# Patient Record
Sex: FEMALE | Race: WHITE | NOT HISPANIC OR LATINO | Employment: UNEMPLOYED | URBAN - METROPOLITAN AREA
[De-identification: names, ages, dates, MRNs, and addresses within clinical notes are randomized per-mention and may not be internally consistent; named-entity substitution may affect disease eponyms.]

---

## 2023-03-13 LAB
ANION GAP IN SER/PLAS: 10 MMOL/L (ref 10–20)
CALCIUM (MG/DL) IN SER/PLAS: 9.3 MG/DL (ref 8.6–10.3)
CARBON DIOXIDE, TOTAL (MMOL/L) IN SER/PLAS: 26 MMOL/L (ref 21–32)
CHLORIDE (MMOL/L) IN SER/PLAS: 107 MMOL/L (ref 98–107)
COBALAMIN (VITAMIN B12) (PG/ML) IN SER/PLAS: >7500 PG/ML (ref 211–911)
CREATININE (MG/DL) IN SER/PLAS: 0.83 MG/DL (ref 0.5–1.05)
ESTIMATED AVERAGE GLUCOSE FOR HBA1C: 105 MG/DL
GFR FEMALE: >90 ML/MIN/1.73M2
GLUCOSE (MG/DL) IN SER/PLAS: 85 MG/DL (ref 74–99)
HEMOGLOBIN A1C/HEMOGLOBIN TOTAL IN BLOOD: 5.3 %
POTASSIUM (MMOL/L) IN SER/PLAS: 4 MMOL/L (ref 3.5–5.3)
SODIUM (MMOL/L) IN SER/PLAS: 139 MMOL/L (ref 136–145)
UREA NITROGEN (MG/DL) IN SER/PLAS: 14 MG/DL (ref 6–23)

## 2023-03-17 ENCOUNTER — TELEPHONE (OUTPATIENT)
Dept: PRIMARY CARE | Facility: CLINIC | Age: 38
End: 2023-03-17

## 2023-03-17 NOTE — TELEPHONE ENCOUNTER
----- Message from CARLOS Peck sent at 3/13/2023  9:03 PM EDT -----  Please tell patient that her lab result is good but hemoglobin A1c of 5.3%.  However, her vitamin B12 level is very high and she should cut back on her intake of vitamin B12 to once weekly.

## 2023-04-14 ENCOUNTER — HOSPITAL ENCOUNTER (OUTPATIENT)
Dept: DATA CONVERSION | Facility: HOSPITAL | Age: 38
End: 2023-04-14
Attending: STUDENT IN AN ORGANIZED HEALTH CARE EDUCATION/TRAINING PROGRAM | Admitting: STUDENT IN AN ORGANIZED HEALTH CARE EDUCATION/TRAINING PROGRAM
Payer: COMMERCIAL

## 2023-04-14 DIAGNOSIS — M79.7 FIBROMYALGIA: ICD-10-CM

## 2023-04-14 DIAGNOSIS — H54.7 UNSPECIFIED VISUAL LOSS: ICD-10-CM

## 2023-04-14 DIAGNOSIS — F41.9 ANXIETY DISORDER, UNSPECIFIED: ICD-10-CM

## 2023-04-14 DIAGNOSIS — N94.6 DYSMENORRHEA, UNSPECIFIED: ICD-10-CM

## 2023-04-14 DIAGNOSIS — E66.9 OBESITY, UNSPECIFIED: ICD-10-CM

## 2023-04-14 DIAGNOSIS — G43.909 MIGRAINE, UNSPECIFIED, NOT INTRACTABLE, WITHOUT STATUS MIGRAINOSUS: ICD-10-CM

## 2023-04-14 DIAGNOSIS — G40.909 EPILEPSY, UNSPECIFIED, NOT INTRACTABLE, WITHOUT STATUS EPILEPTICUS (MULTI): ICD-10-CM

## 2023-04-14 DIAGNOSIS — J45.909 UNSPECIFIED ASTHMA, UNCOMPLICATED (HHS-HCC): ICD-10-CM

## 2023-04-14 DIAGNOSIS — F32.A DEPRESSION, UNSPECIFIED: ICD-10-CM

## 2023-04-14 DIAGNOSIS — N20.0 CALCULUS OF KIDNEY: ICD-10-CM

## 2023-04-14 DIAGNOSIS — N39.3 STRESS INCONTINENCE (FEMALE) (MALE): ICD-10-CM

## 2023-04-14 DIAGNOSIS — N32.81 OVERACTIVE BLADDER: ICD-10-CM

## 2023-04-14 DIAGNOSIS — Z79.84 LONG TERM (CURRENT) USE OF ORAL HYPOGLYCEMIC DRUGS: ICD-10-CM

## 2023-04-14 DIAGNOSIS — N39.41 URGE INCONTINENCE: ICD-10-CM

## 2023-04-14 DIAGNOSIS — K21.9 GASTRO-ESOPHAGEAL REFLUX DISEASE WITHOUT ESOPHAGITIS: ICD-10-CM

## 2023-04-14 DIAGNOSIS — E11.9 TYPE 2 DIABETES MELLITUS WITHOUT COMPLICATIONS (MULTI): ICD-10-CM

## 2023-04-14 DIAGNOSIS — G56.02 CARPAL TUNNEL SYNDROME, LEFT UPPER LIMB: ICD-10-CM

## 2023-04-14 DIAGNOSIS — Z87.891 PERSONAL HISTORY OF NICOTINE DEPENDENCE: ICD-10-CM

## 2023-04-14 LAB — POCT GLUCOSE: 84 MG/DL (ref 74–99)

## 2023-04-28 ENCOUNTER — TELEPHONE (OUTPATIENT)
Dept: PRIMARY CARE | Facility: CLINIC | Age: 38
End: 2023-04-28

## 2023-04-28 ENCOUNTER — HOSPITAL ENCOUNTER (OUTPATIENT)
Dept: DATA CONVERSION | Facility: HOSPITAL | Age: 38
End: 2023-04-28
Attending: STUDENT IN AN ORGANIZED HEALTH CARE EDUCATION/TRAINING PROGRAM | Admitting: STUDENT IN AN ORGANIZED HEALTH CARE EDUCATION/TRAINING PROGRAM
Payer: COMMERCIAL

## 2023-04-28 DIAGNOSIS — N20.0 CALCULUS OF KIDNEY: ICD-10-CM

## 2023-04-28 DIAGNOSIS — G56.03 CARPAL TUNNEL SYNDROME, BILATERAL UPPER LIMBS: ICD-10-CM

## 2023-04-28 DIAGNOSIS — Z87.891 PERSONAL HISTORY OF NICOTINE DEPENDENCE: ICD-10-CM

## 2023-04-28 DIAGNOSIS — J45.909 UNSPECIFIED ASTHMA, UNCOMPLICATED (HHS-HCC): ICD-10-CM

## 2023-04-28 DIAGNOSIS — E11.65 TYPE 2 DIABETES MELLITUS WITH HYPERGLYCEMIA, WITHOUT LONG-TERM CURRENT USE OF INSULIN (MULTI): ICD-10-CM

## 2023-04-28 DIAGNOSIS — M79.642 PAIN IN LEFT HAND: ICD-10-CM

## 2023-04-28 DIAGNOSIS — F32.A DEPRESSION, UNSPECIFIED: ICD-10-CM

## 2023-04-28 DIAGNOSIS — Z79.84 LONG TERM (CURRENT) USE OF ORAL HYPOGLYCEMIC DRUGS: ICD-10-CM

## 2023-04-28 DIAGNOSIS — M25.562 PAIN IN LEFT KNEE: ICD-10-CM

## 2023-04-28 DIAGNOSIS — F41.9 ANXIETY DISORDER, UNSPECIFIED: ICD-10-CM

## 2023-04-28 DIAGNOSIS — E11.65 TYPE 2 DIABETES MELLITUS WITH HYPERGLYCEMIA (MULTI): ICD-10-CM

## 2023-04-28 DIAGNOSIS — M51.36 OTHER INTERVERTEBRAL DISC DEGENERATION, LUMBAR REGION: ICD-10-CM

## 2023-04-28 DIAGNOSIS — J34.2 DEVIATED NASAL SEPTUM: ICD-10-CM

## 2023-04-28 DIAGNOSIS — G89.29 OTHER CHRONIC PAIN: ICD-10-CM

## 2023-04-28 DIAGNOSIS — M79.605 PAIN IN LEFT LEG: ICD-10-CM

## 2023-04-28 DIAGNOSIS — M47.26 OTHER SPONDYLOSIS WITH RADICULOPATHY, LUMBAR REGION: ICD-10-CM

## 2023-04-28 DIAGNOSIS — M79.7 FIBROMYALGIA: ICD-10-CM

## 2023-04-28 DIAGNOSIS — E11.29 TYPE 2 DIABETES MELLITUS WITH OTHER DIABETIC KIDNEY COMPLICATION (MULTI): ICD-10-CM

## 2023-04-28 DIAGNOSIS — E11.42 TYPE 2 DIABETES MELLITUS WITH DIABETIC POLYNEUROPATHY (MULTI): ICD-10-CM

## 2023-04-28 DIAGNOSIS — E55.9 VITAMIN D DEFICIENCY, UNSPECIFIED: ICD-10-CM

## 2023-04-28 DIAGNOSIS — K58.1 IRRITABLE BOWEL SYNDROME WITH CONSTIPATION: ICD-10-CM

## 2023-04-28 DIAGNOSIS — M25.572 PAIN IN LEFT ANKLE AND JOINTS OF LEFT FOOT: ICD-10-CM

## 2023-04-28 DIAGNOSIS — N13.30 UNSPECIFIED HYDRONEPHROSIS: ICD-10-CM

## 2023-04-28 DIAGNOSIS — E53.8 DEFICIENCY OF OTHER SPECIFIED B GROUP VITAMINS: ICD-10-CM

## 2023-04-28 DIAGNOSIS — E11.9 TYPE 2 DIABETES MELLITUS WITHOUT COMPLICATIONS (MULTI): ICD-10-CM

## 2023-04-28 DIAGNOSIS — K21.9 GASTROESOPHAGEAL REFLUX DISEASE WITHOUT ESOPHAGITIS: Primary | ICD-10-CM

## 2023-04-28 DIAGNOSIS — N39.41 URGE INCONTINENCE: ICD-10-CM

## 2023-04-28 DIAGNOSIS — M79.672 PAIN IN LEFT FOOT: ICD-10-CM

## 2023-04-28 DIAGNOSIS — E66.9 OBESITY, UNSPECIFIED: ICD-10-CM

## 2023-04-28 DIAGNOSIS — K29.70 GASTRITIS, UNSPECIFIED, WITHOUT BLEEDING: ICD-10-CM

## 2023-04-28 DIAGNOSIS — G43.909 MIGRAINE, UNSPECIFIED, NOT INTRACTABLE, WITHOUT STATUS MIGRAINOSUS: ICD-10-CM

## 2023-04-28 DIAGNOSIS — H54.7 UNSPECIFIED VISUAL LOSS: ICD-10-CM

## 2023-04-28 DIAGNOSIS — G56.13 OTHER LESIONS OF MEDIAN NERVE, BILATERAL UPPER LIMBS: ICD-10-CM

## 2023-04-28 DIAGNOSIS — K21.9 GASTRO-ESOPHAGEAL REFLUX DISEASE WITHOUT ESOPHAGITIS: ICD-10-CM

## 2023-04-28 DIAGNOSIS — M65.4 RADIAL STYLOID TENOSYNOVITIS (DE QUERVAIN): ICD-10-CM

## 2023-04-28 DIAGNOSIS — N32.81 OVERACTIVE BLADDER: ICD-10-CM

## 2023-04-28 DIAGNOSIS — G40.909 EPILEPSY, UNSPECIFIED, NOT INTRACTABLE, WITHOUT STATUS EPILEPTICUS (MULTI): ICD-10-CM

## 2023-04-28 LAB
ATRIAL RATE: 69 BPM
P AXIS: 22 DEGREES
POCT GLUCOSE: 81 MG/DL (ref 74–99)
PR INTERVAL: 140 MS
Q ONSET: 251 MS
QRS COUNT: 12 BEATS
QRS DURATION: 99 MS
QT INTERVAL: 424 MS
QTC CALCULATION(BAZETT): 461 MS
QTC FREDERICIA: 448 MS
R AXIS: 1 DEGREES
T AXIS: 12 DEGREES
T OFFSET: 463 MS
VENTRICULAR RATE: 71 BPM

## 2023-04-28 RX ORDER — PANTOPRAZOLE SODIUM 40 MG/1
40 TABLET, DELAYED RELEASE ORAL DAILY
COMMUNITY
Start: 2020-01-16 | End: 2023-05-01 | Stop reason: SDUPTHER

## 2023-04-28 RX ORDER — DAPAGLIFLOZIN 5 MG/1
5 TABLET, FILM COATED ORAL DAILY
COMMUNITY
End: 2023-05-01 | Stop reason: SINTOL

## 2023-04-28 NOTE — TELEPHONE ENCOUNTER
Patient states that she has been having multiple yeast infections from the farxiga 5mg. Patient is requesting something else to be called in if possible.      Refill on pantoprazole 40mg to [facility],

## 2023-05-01 RX ORDER — ORAL SEMAGLUTIDE 7 MG/1
7 TABLET ORAL DAILY
Qty: 90 TABLET | Refills: 2 | Status: SHIPPED | OUTPATIENT
Start: 2023-05-01 | End: 2024-01-29 | Stop reason: SDUPTHER

## 2023-05-01 RX ORDER — PANTOPRAZOLE SODIUM 40 MG/1
40 TABLET, DELAYED RELEASE ORAL DAILY
Qty: 90 TABLET | Refills: 2 | Status: SHIPPED | OUTPATIENT
Start: 2023-05-01 | End: 2023-10-24

## 2023-05-02 ENCOUNTER — TELEPHONE (OUTPATIENT)
Dept: PRIMARY CARE | Facility: CLINIC | Age: 38
End: 2023-05-02
Payer: COMMERCIAL

## 2023-05-02 NOTE — TELEPHONE ENCOUNTER
She would Like us to call in for weight loss if could ozempic her pharmacy is [facility] in [place]

## 2023-05-19 PROBLEM — B37.9 YEAST INFECTION: Status: RESOLVED | Noted: 2023-05-19 | Resolved: 2023-05-19

## 2023-05-19 PROBLEM — N39.46 MIXED INCONTINENCE: Status: RESOLVED | Noted: 2023-05-19 | Resolved: 2023-05-19

## 2023-05-19 PROBLEM — E11.65 TYPE 2 DIABETES MELLITUS WITH HYPERGLYCEMIA, WITHOUT LONG-TERM CURRENT USE OF INSULIN (MULTI): Status: ACTIVE | Noted: 2023-05-19

## 2023-05-19 PROBLEM — M25.562 PAIN IN LEFT KNEE: Status: RESOLVED | Noted: 2023-05-19 | Resolved: 2023-05-19

## 2023-05-19 PROBLEM — G43.909 MIGRAINES: Status: ACTIVE | Noted: 2023-05-19

## 2023-05-19 PROBLEM — R25.1 FUNCTIONAL TREMOR: Status: RESOLVED | Noted: 2023-05-19 | Resolved: 2023-05-19

## 2023-05-19 PROBLEM — R09.81 NASAL CONGESTION: Status: RESOLVED | Noted: 2023-05-19 | Resolved: 2023-05-19

## 2023-05-19 PROBLEM — R10.9 LEFT SIDED ABDOMINAL PAIN: Status: RESOLVED | Noted: 2023-05-19 | Resolved: 2023-05-19

## 2023-05-19 PROBLEM — M24.832: Status: RESOLVED | Noted: 2023-05-19 | Resolved: 2023-05-19

## 2023-05-19 PROBLEM — F32.A DEPRESSION: Status: RESOLVED | Noted: 2023-05-19 | Resolved: 2023-05-19

## 2023-05-19 PROBLEM — F44.7 FUNCTIONAL NEUROLOGICAL SYMPTOM DISORDER WITH MIXED SYMPTOMS: Status: RESOLVED | Noted: 2023-05-19 | Resolved: 2023-05-19

## 2023-05-19 PROBLEM — R25.1 INTERMITTENT TREMOR: Status: ACTIVE | Noted: 2023-05-19

## 2023-05-19 PROBLEM — E53.8 LOW SERUM VITAMIN B12: Status: ACTIVE | Noted: 2023-05-19

## 2023-05-19 PROBLEM — F44.5 PSYCHOGENIC NONEPILEPTIC SEIZURE: Status: RESOLVED | Noted: 2023-05-19 | Resolved: 2023-05-19

## 2023-05-19 PROBLEM — K29.70 GASTRITIS: Status: ACTIVE | Noted: 2023-05-19

## 2023-05-19 PROBLEM — N20.0 CALCIUM KIDNEY STONE: Status: RESOLVED | Noted: 2023-05-19 | Resolved: 2023-05-19

## 2023-05-19 PROBLEM — R29.898 WEAKNESS OF EXTREMITY: Status: RESOLVED | Noted: 2023-05-19 | Resolved: 2023-05-19

## 2023-05-19 PROBLEM — M89.9 OSTEOCHONDRAL LESION: Status: RESOLVED | Noted: 2023-05-19 | Resolved: 2023-05-19

## 2023-05-19 PROBLEM — M25.572 ACUTE LEFT ANKLE PAIN: Status: RESOLVED | Noted: 2023-05-19 | Resolved: 2023-05-19

## 2023-05-19 PROBLEM — F41.9 ANXIETY: Status: ACTIVE | Noted: 2023-05-19

## 2023-05-19 PROBLEM — M65.88: Status: RESOLVED | Noted: 2023-05-19 | Resolved: 2023-05-19

## 2023-05-19 PROBLEM — M79.605 DIFFUSE PAIN IN LEFT LOWER EXTREMITY: Status: RESOLVED | Noted: 2023-05-19 | Resolved: 2023-05-19

## 2023-05-19 PROBLEM — M65.4 DE QUERVAIN'S TENOSYNOVITIS, LEFT: Status: RESOLVED | Noted: 2023-05-19 | Resolved: 2023-05-19

## 2023-05-19 PROBLEM — J45.909 ASTHMA (HHS-HCC): Status: ACTIVE | Noted: 2023-05-19

## 2023-05-19 PROBLEM — N39.498 FREQUENT URINARY INCONTINENCE: Status: RESOLVED | Noted: 2023-05-19 | Resolved: 2023-05-19

## 2023-05-19 PROBLEM — R26.9 GAIT DISORDER: Status: ACTIVE | Noted: 2023-05-19

## 2023-05-19 PROBLEM — M54.16 LUMBAR RADICULOPATHY, CHRONIC: Status: RESOLVED | Noted: 2023-05-19 | Resolved: 2023-05-19

## 2023-05-19 PROBLEM — R74.01 ELEVATED LIVER TRANSAMINASE LEVEL: Status: RESOLVED | Noted: 2023-05-19 | Resolved: 2023-05-19

## 2023-05-19 PROBLEM — M79.672 LEFT FOOT PAIN: Status: RESOLVED | Noted: 2023-05-19 | Resolved: 2023-05-19

## 2023-05-19 PROBLEM — E55.9 VITAMIN D DEFICIENCY: Status: ACTIVE | Noted: 2023-05-19

## 2023-05-19 PROBLEM — M79.7 FIBROMYALGIA: Status: ACTIVE | Noted: 2023-05-19

## 2023-05-19 PROBLEM — M51.369 DEGENERATION OF INTERVERTEBRAL DISC OF LUMBAR REGION: Status: RESOLVED | Noted: 2023-05-19 | Resolved: 2023-05-19

## 2023-05-19 PROBLEM — R56.9 NONEPILEPTIC EPISODE (MULTI): Status: RESOLVED | Noted: 2023-05-19 | Resolved: 2023-05-19

## 2023-05-19 PROBLEM — F43.89 STRESS REACTION, CHRONIC: Status: RESOLVED | Noted: 2023-05-19 | Resolved: 2023-05-19

## 2023-05-19 PROBLEM — R13.10 DYSPHAGIA: Status: RESOLVED | Noted: 2023-05-19 | Resolved: 2023-05-19

## 2023-05-19 PROBLEM — S63.502A WRIST SPRAIN, LEFT, INITIAL ENCOUNTER: Status: RESOLVED | Noted: 2023-05-19 | Resolved: 2023-05-19

## 2023-05-19 PROBLEM — M47.816 LUMBAR SPONDYLOSIS: Status: RESOLVED | Noted: 2023-05-19 | Resolved: 2023-05-19

## 2023-05-19 PROBLEM — R35.0 URINATION FREQUENCY: Status: RESOLVED | Noted: 2023-05-19 | Resolved: 2023-05-19

## 2023-05-19 PROBLEM — M54.6 BACK PAIN, THORACIC: Status: RESOLVED | Noted: 2023-05-19 | Resolved: 2023-05-19

## 2023-05-19 PROBLEM — F44.9 CONVERSION DISORDER: Status: RESOLVED | Noted: 2023-05-19 | Resolved: 2023-05-19

## 2023-05-19 PROBLEM — R29.898: Status: RESOLVED | Noted: 2023-05-19 | Resolved: 2023-05-19

## 2023-05-19 PROBLEM — M51.36 DEGENERATION OF INTERVERTEBRAL DISC OF LUMBAR REGION: Status: RESOLVED | Noted: 2023-05-19 | Resolved: 2023-05-19

## 2023-05-19 PROBLEM — K58.1 IRRITABLE BOWEL SYNDROME WITH CONSTIPATION: Status: RESOLVED | Noted: 2023-05-19 | Resolved: 2023-05-19

## 2023-05-19 PROBLEM — M54.50 LOWER BACK PAIN: Status: RESOLVED | Noted: 2023-05-19 | Resolved: 2023-05-19

## 2023-05-19 PROBLEM — Z59.41 FOOD INSECURITY: Status: RESOLVED | Noted: 2023-05-19 | Resolved: 2023-05-19

## 2023-05-19 PROBLEM — M94.9 OSTEOCHONDRAL LESION: Status: RESOLVED | Noted: 2023-05-19 | Resolved: 2023-05-19

## 2023-05-19 PROBLEM — J34.2 DEVIATED NASAL SEPTUM: Status: RESOLVED | Noted: 2023-05-19 | Resolved: 2023-05-19

## 2023-05-19 PROBLEM — R43.8 DECREASED SENSE OF SMELL: Status: RESOLVED | Noted: 2023-05-19 | Resolved: 2023-05-19

## 2023-05-19 PROBLEM — R10.9 LEFT FLANK PAIN: Status: RESOLVED | Noted: 2023-05-19 | Resolved: 2023-05-19

## 2023-05-19 PROBLEM — R30.0 DYSURIA: Status: RESOLVED | Noted: 2023-05-19 | Resolved: 2023-05-19

## 2023-05-19 PROBLEM — N13.30 HYDRONEPHROSIS: Status: ACTIVE | Noted: 2023-05-19

## 2023-05-19 PROBLEM — G56.03 BILATERAL CARPAL TUNNEL SYNDROME: Status: ACTIVE | Noted: 2023-05-19

## 2023-05-19 PROBLEM — N39.0 UTI (URINARY TRACT INFECTION): Status: RESOLVED | Noted: 2023-05-19 | Resolved: 2023-05-19

## 2023-05-19 PROBLEM — M79.642 LEFT HAND PAIN: Status: RESOLVED | Noted: 2023-05-19 | Resolved: 2023-05-19

## 2023-05-19 PROBLEM — G56.13 MEDIAN NEUROPATHY OF BOTH UPPER EXTREMITIES: Status: RESOLVED | Noted: 2023-05-19 | Resolved: 2023-05-19

## 2023-05-19 PROBLEM — G43.019 INTRACTABLE MIGRAINE WITHOUT AURA AND WITHOUT STATUS MIGRAINOSUS: Status: ACTIVE | Noted: 2023-05-19

## 2023-05-19 PROBLEM — M77.9 TENDONITIS: Status: RESOLVED | Noted: 2023-05-19 | Resolved: 2023-05-19

## 2023-05-19 PROBLEM — G62.9 POLYNEUROPATHY, UNSPECIFIED: Status: RESOLVED | Noted: 2023-05-19 | Resolved: 2023-05-19

## 2023-05-19 PROBLEM — S83.242A TEAR OF MEDIAL MENISCUS OF LEFT KNEE, CURRENT: Status: RESOLVED | Noted: 2023-05-19 | Resolved: 2023-05-19

## 2023-05-19 PROBLEM — K62.5 RECTAL BLEEDING: Status: RESOLVED | Noted: 2023-05-19 | Resolved: 2023-05-19

## 2023-05-19 PROBLEM — R53.83 FATIGUE: Status: RESOLVED | Noted: 2023-05-19 | Resolved: 2023-05-19

## 2023-05-19 PROBLEM — R63.5 WEIGHT GAIN: Status: RESOLVED | Noted: 2023-05-19 | Resolved: 2023-05-19

## 2023-05-19 PROBLEM — R51.9 FREQUENT HEADACHES: Status: RESOLVED | Noted: 2023-05-19 | Resolved: 2023-05-19

## 2023-05-19 PROBLEM — E78.5 DYSLIPIDEMIA: Status: ACTIVE | Noted: 2023-05-19

## 2023-05-19 PROBLEM — N39.3 STRESS INCONTINENCE: Status: RESOLVED | Noted: 2023-05-19 | Resolved: 2023-05-19

## 2023-05-19 RX ORDER — ROSUVASTATIN CALCIUM 20 MG/1
20 TABLET, COATED ORAL EVERY EVENING
COMMUNITY
End: 2023-07-26 | Stop reason: SDUPTHER

## 2023-05-19 RX ORDER — NEEDLES, FILTER 19GX1 1/2"
2 NEEDLE, DISPOSABLE MISCELLANEOUS
COMMUNITY
Start: 2023-02-24

## 2023-05-19 RX ORDER — TIOTROPIUM BROMIDE INHALATION SPRAY 1.56 UG/1
2 SPRAY, METERED RESPIRATORY (INHALATION) DAILY
COMMUNITY

## 2023-05-19 RX ORDER — ALBUTEROL SULFATE 90 UG/1
2 AEROSOL, METERED RESPIRATORY (INHALATION) EVERY 4 HOURS PRN
COMMUNITY
End: 2023-07-26 | Stop reason: SDUPTHER

## 2023-05-19 RX ORDER — CYANOCOBALAMIN 1000 UG/ML
1000 INJECTION, SOLUTION INTRAMUSCULAR; SUBCUTANEOUS SEE ADMIN INSTRUCTIONS
COMMUNITY

## 2023-05-19 RX ORDER — TOPIRAMATE 25 MG/1
25 TABLET ORAL DAILY
COMMUNITY

## 2023-05-19 RX ORDER — FLUCONAZOLE 150 MG/1
150 TABLET ORAL ONCE
COMMUNITY
Start: 2023-05-01 | End: 2023-11-13

## 2023-05-19 RX ORDER — DULOXETIN HYDROCHLORIDE 60 MG/1
60 CAPSULE, DELAYED RELEASE ORAL DAILY
COMMUNITY
End: 2024-01-30 | Stop reason: SDUPTHER

## 2023-05-19 RX ORDER — BLOOD SUGAR DIAGNOSTIC
1 STRIP MISCELLANEOUS DAILY
COMMUNITY
Start: 2021-03-25

## 2023-05-19 RX ORDER — METFORMIN HYDROCHLORIDE 1000 MG/1
1000 TABLET ORAL 2 TIMES DAILY
COMMUNITY
End: 2023-07-26 | Stop reason: ALTCHOICE

## 2023-05-24 ENCOUNTER — OFFICE VISIT (OUTPATIENT)
Dept: PRIMARY CARE | Facility: CLINIC | Age: 38
End: 2023-05-24
Payer: COMMERCIAL

## 2023-05-24 VITALS — HEART RATE: 100 BPM | DIASTOLIC BLOOD PRESSURE: 72 MMHG | SYSTOLIC BLOOD PRESSURE: 118 MMHG | OXYGEN SATURATION: 97 %

## 2023-05-24 DIAGNOSIS — Z13.29 SCREENING FOR THYROID DISORDER: ICD-10-CM

## 2023-05-24 DIAGNOSIS — E55.9 VITAMIN D DEFICIENCY: ICD-10-CM

## 2023-05-24 DIAGNOSIS — E11.65 TYPE 2 DIABETES MELLITUS WITH HYPERGLYCEMIA, WITHOUT LONG-TERM CURRENT USE OF INSULIN (MULTI): Primary | ICD-10-CM

## 2023-05-24 DIAGNOSIS — E53.8 LOW SERUM VITAMIN B12: ICD-10-CM

## 2023-05-24 DIAGNOSIS — E78.5 DYSLIPIDEMIA: ICD-10-CM

## 2023-05-24 PROCEDURE — G0447 BEHAVIOR COUNSEL OBESITY 15M: HCPCS | Performed by: NURSE PRACTITIONER

## 2023-05-24 PROCEDURE — 3078F DIAST BP <80 MM HG: CPT | Performed by: NURSE PRACTITIONER

## 2023-05-24 PROCEDURE — 3074F SYST BP LT 130 MM HG: CPT | Performed by: NURSE PRACTITIONER

## 2023-05-24 PROCEDURE — 3044F HG A1C LEVEL LT 7.0%: CPT | Performed by: NURSE PRACTITIONER

## 2023-05-24 PROCEDURE — 3066F NEPHROPATHY DOC TX: CPT | Performed by: NURSE PRACTITIONER

## 2023-05-24 PROCEDURE — 1036F TOBACCO NON-USER: CPT | Performed by: NURSE PRACTITIONER

## 2023-05-24 PROCEDURE — 99214 OFFICE O/P EST MOD 30 MIN: CPT | Performed by: NURSE PRACTITIONER

## 2023-05-24 PROCEDURE — 96372 THER/PROPH/DIAG INJ SC/IM: CPT | Performed by: NURSE PRACTITIONER

## 2023-05-24 PROCEDURE — 3008F BODY MASS INDEX DOCD: CPT | Performed by: NURSE PRACTITIONER

## 2023-05-24 RX ORDER — CYANOCOBALAMIN 1000 UG/ML
1000 INJECTION, SOLUTION INTRAMUSCULAR; SUBCUTANEOUS ONCE
Status: COMPLETED | OUTPATIENT
Start: 2023-05-24 | End: 2023-05-24

## 2023-05-24 RX ADMIN — CYANOCOBALAMIN 1000 MCG: 1000 INJECTION, SOLUTION INTRAMUSCULAR; SUBCUTANEOUS at 12:34

## 2023-05-24 NOTE — PATIENT INSTRUCTIONS
Come in monthly for vitamin B12 injections., nurse visits.  Continue taking all current medications as prescribed, complete labs prior to 6 months follow up with me.

## 2023-05-26 ENCOUNTER — APPOINTMENT (OUTPATIENT)
Dept: PRIMARY CARE | Facility: CLINIC | Age: 38
End: 2023-05-26
Payer: COMMERCIAL

## 2023-06-26 ENCOUNTER — APPOINTMENT (OUTPATIENT)
Dept: PRIMARY CARE | Facility: CLINIC | Age: 38
End: 2023-06-26
Payer: COMMERCIAL

## 2023-07-26 ENCOUNTER — CLINICAL SUPPORT (OUTPATIENT)
Dept: PRIMARY CARE | Facility: CLINIC | Age: 38
End: 2023-07-26
Payer: COMMERCIAL

## 2023-07-26 DIAGNOSIS — E53.8 LOW SERUM VITAMIN B12: Primary | ICD-10-CM

## 2023-07-26 DIAGNOSIS — E11.65 TYPE 2 DIABETES MELLITUS WITH HYPERGLYCEMIA, WITHOUT LONG-TERM CURRENT USE OF INSULIN (MULTI): ICD-10-CM

## 2023-07-26 DIAGNOSIS — E78.5 DYSLIPIDEMIA: Primary | ICD-10-CM

## 2023-07-26 DIAGNOSIS — J45.909 MODERATE ASTHMA, UNSPECIFIED WHETHER COMPLICATED, UNSPECIFIED WHETHER PERSISTENT (HHS-HCC): Primary | ICD-10-CM

## 2023-07-26 PROCEDURE — 96372 THER/PROPH/DIAG INJ SC/IM: CPT | Performed by: NURSE PRACTITIONER

## 2023-07-26 RX ORDER — ROSUVASTATIN CALCIUM 20 MG/1
20 TABLET, COATED ORAL EVERY EVENING
Qty: 90 TABLET | Refills: 2 | Status: SHIPPED | OUTPATIENT
Start: 2023-07-26 | End: 2024-03-14

## 2023-07-26 RX ORDER — METFORMIN HYDROCHLORIDE 1000 MG/1
1000 TABLET ORAL 2 TIMES DAILY
Qty: 180 TABLET | Refills: 2 | Status: SHIPPED | OUTPATIENT
Start: 2023-07-26 | End: 2023-11-22 | Stop reason: ALTCHOICE

## 2023-07-26 RX ORDER — ALBUTEROL SULFATE 90 UG/1
2 AEROSOL, METERED RESPIRATORY (INHALATION) EVERY 4 HOURS PRN
Qty: 18 G | Refills: 3 | Status: CANCELLED | OUTPATIENT
Start: 2023-07-26

## 2023-07-26 RX ORDER — CYANOCOBALAMIN 1000 UG/ML
1000 INJECTION, SOLUTION INTRAMUSCULAR; SUBCUTANEOUS ONCE
Status: COMPLETED | OUTPATIENT
Start: 2023-07-26 | End: 2023-07-26

## 2023-07-26 RX ORDER — ALBUTEROL SULFATE 90 UG/1
2 AEROSOL, METERED RESPIRATORY (INHALATION) EVERY 4 HOURS PRN
Qty: 18 G | Refills: 11 | Status: SHIPPED | OUTPATIENT
Start: 2023-07-26

## 2023-07-26 RX ADMIN — CYANOCOBALAMIN 1000 MCG: 1000 INJECTION, SOLUTION INTRAMUSCULAR; SUBCUTANEOUS at 11:39

## 2023-08-25 ENCOUNTER — APPOINTMENT (OUTPATIENT)
Dept: PRIMARY CARE | Facility: CLINIC | Age: 38
End: 2023-08-25
Payer: COMMERCIAL

## 2023-09-07 VITALS — BODY MASS INDEX: 33.12 KG/M2 | HEIGHT: 64 IN | WEIGHT: 194 LBS

## 2023-09-13 PROBLEM — R73.9 HYPERGLYCEMIA: Status: ACTIVE | Noted: 2023-09-13

## 2023-09-13 PROBLEM — F32.9 MAJOR DEPRESSIVE DISORDER, SINGLE EPISODE, UNSPECIFIED: Status: ACTIVE | Noted: 2018-12-27

## 2023-09-13 PROBLEM — K21.9 GASTRO-ESOPHAGEAL REFLUX DISEASE WITHOUT ESOPHAGITIS: Status: ACTIVE | Noted: 2018-12-27

## 2023-09-13 PROBLEM — R56.9 SEIZURE-LIKE ACTIVITY (MULTI): Status: ACTIVE | Noted: 2022-03-14

## 2023-09-13 RX ORDER — POLYETHYLENE GLYCOL 3350 17 G/17G
POWDER, FOR SOLUTION ORAL
COMMUNITY
Start: 2022-09-23

## 2023-09-13 RX ORDER — DEXTROSE MONOHYDRATE 50 MG/ML
INJECTION, SOLUTION INTRAVENOUS
COMMUNITY
Start: 2022-03-14

## 2023-09-13 RX ORDER — KETOROLAC TROMETHAMINE 10 MG/1
10 TABLET, FILM COATED ORAL EVERY 6 HOURS PRN
COMMUNITY
Start: 2023-04-14

## 2023-09-13 RX ORDER — DAPAGLIFLOZIN 5 MG/1
TABLET, FILM COATED ORAL
COMMUNITY
Start: 2021-06-28 | End: 2023-11-22 | Stop reason: ALTCHOICE

## 2023-09-13 RX ORDER — NITROFURANTOIN 25; 75 MG/1; MG/1
1 CAPSULE ORAL 2 TIMES DAILY
COMMUNITY
Start: 2022-12-29 | End: 2023-11-22 | Stop reason: ALTCHOICE

## 2023-09-13 RX ORDER — TRAMADOL HYDROCHLORIDE 50 MG/1
50 TABLET ORAL EVERY 6 HOURS PRN
COMMUNITY

## 2023-09-13 RX ORDER — SULFAMETHOXAZOLE AND TRIMETHOPRIM 400; 80 MG/1; MG/1
1 TABLET ORAL 2 TIMES DAILY
COMMUNITY
Start: 2023-01-17 | End: 2023-11-22 | Stop reason: ALTCHOICE

## 2023-09-13 RX ORDER — AMOXICILLIN 250 MG
2 CAPSULE ORAL NIGHTLY
COMMUNITY
Start: 2022-09-23

## 2023-09-13 RX ORDER — FLUTICASONE PROPIONATE AND SALMETEROL 232; 14 UG/1; UG/1
POWDER, METERED RESPIRATORY (INHALATION)
COMMUNITY

## 2023-09-13 RX ORDER — OXYBUTYNIN CHLORIDE 10 MG/1
10 TABLET, EXTENDED RELEASE ORAL DAILY
COMMUNITY

## 2023-09-13 RX ORDER — SUMATRIPTAN SUCCINATE 100 MG/1
100 TABLET ORAL AS NEEDED
COMMUNITY
Start: 2022-10-11

## 2023-09-13 RX ORDER — BUDESONIDE AND FORMOTEROL FUMARATE DIHYDRATE 160; 4.5 UG/1; UG/1
2 AEROSOL RESPIRATORY (INHALATION) 2 TIMES DAILY
COMMUNITY

## 2023-09-13 RX ORDER — BIOTIN 1000 MCG
2 TABLET,CHEWABLE ORAL DAILY
COMMUNITY

## 2023-09-13 RX ORDER — ACETAMINOPHEN 500 MG
1000 TABLET ORAL EVERY 6 HOURS PRN
COMMUNITY
Start: 2022-03-14

## 2023-09-13 RX ORDER — DIPHENHYDRAMINE HCL 25 MG
25 TABLET ORAL
COMMUNITY
Start: 2021-09-24

## 2023-09-14 NOTE — H&P
History of Present Illness:   Pregnant/Lactating:  ·  Are You Pregnant no (1)   ·  Are You Currently Breastfeeding no (1)     HPI:    ROSALEE ROONEY is a 37 year old Female here for snm            Allergies:  ·  NKDA :   ·  Bee  Stings: Other  ·  cocconut. : Rash  ·  Pollen : Unknown    Medications Prior to Admission:   Admission Medication Reconciliation has not been completed for this patient.    Review of Systems:   Constitutional: NEGATIVE: Fever, Chills, Anorexia,  Weight Loss, Malaise     Eyes: NEGATIVE: Blurry Vision, Drainage, Diploplia,  Redness, Vision Loss/ Change     ENMT: NEGATIVE: Nasal Discharge, Nasal Congestion,  Ear Pain, Mouth Pain, Throat Pain     Respiratory: NEGATIVE: Dry Cough, Productive Cough,  Hemoptysis, Wheezing, Shortness of Breath     Cardiac: NEGATIVE: Chest Pain, Dyspnea on Exertion,  Orthopnea, Palpitations, Syncope     Genitourinary: NEGATIVE: Discharge, Dysuria, Flank  Pain, Frequency, Hematuria     Musculoskeletal: NEGATIVE: Decreased ROM, Pain, Swelling,  Stiffness, Weakness     Neurological: NEGATIVE: Dizziness, Confusion, Headache,  Seizures, Syncope     Psychiatric: NEGATIVE: Mood Changes, Anxiety, Hallucinations,  Sleep Changes, Suicidal Ideas     Skin: NEGATIVE: Mass, Pain, Pruritus, Rash, Ulcer       Objective:     Objective Information:        Pain reported at 4/14 13:40: 0 = None    Physical Exam by System:    Constitutional: Well developed, awake/alert/oriented  x3, no distress, alert and cooperative   Respiratory/Thorax: Patent airways, CTAB, normal  breath sounds   Cardiovascular: Regular, rate and rhythm, no murmurs,  2+ equal pulses of the extremities,   Gastrointestinal: Nondistended, soft, non-tender,  no rebound tenderness or guarding, , +BS,   Extremities: normal extremities, no cyanosis edema,  contusions or wounds, no clubbing   Neurological: alert and oriented x3, intact senses,  motor, response and reflexes, normal strength   Psychological: Appropriate  "mood and behavior     Assessment and Plan:   Assessment:    as above      Electronic Signatures:  Aris Macias)  (Signed 14-Apr-2023 16:05)   Authored: History of Present Illness, Comorbidities,  Allergies, Medications Prior to Admission, Review of Systems, Objective, Assessment and Plan, Note Completion      Last Updated: 14-Apr-2023 16:05 by Aris Macias)    References:  1.  Data Referenced From \"History and Physical - Surgical Update < 30 days\" 14-Apr-2023 15:40   "

## 2023-09-14 NOTE — H&P
History & Physical Reviewed:   Pregnant/Lactating:  ·  Are You Pregnant no   ·  Are You Currently Breastfeeding no     I have reviewed the History and Physical dated:  14-Apr-2023   History and Physical reviewed and relevant findings noted. Patient examined to review pertinent physical  findings.: No significant changes   Home Medications Reviewed: no changes noted   Allergies Reviewed: no changes noted       ERAS (Enhanced Recovery After Surgery):  ·  ERAS Patient: no     Consent:   COVID-19 Consent:  ·  COVID-19 Risk Consent Surgeon has reviewed key risks related to the risk of balta COVID-19 and if they contract COVID-19 what the risks are.       Electronic Signatures:  Aris Macias)  (Signed 14-Apr-2023 15:41)   Authored: History & Physical Reviewed, ERAS, Consent,  Note Completion      Last Updated: 14-Apr-2023 15:41 by Aris Macias)

## 2023-09-14 NOTE — H&P
History & Physical Reviewed:   Pregnant/Lactating:  ·  Are You Pregnant no   ·  Are You Currently Breastfeeding no     I have reviewed the History and Physical dated:  28-Apr-2023   History and Physical reviewed and relevant findings noted. Patient examined to review pertinent physical  findings.: No significant changes   Home Medications Reviewed: no changes noted   Allergies Reviewed: no changes noted       ERAS (Enhanced Recovery After Surgery):  ·  ERAS Patient: no     Consent:   COVID-19 Consent:  ·  COVID-19 Risk Consent Surgeon has reviewed key risks related to the risk of balta COVID-19 and if they contract COVID-19 what the risks are.       Electronic Signatures:  Aris Macias)  (Signed 28-Apr-2023 09:45)   Authored: History & Physical Reviewed, ERAS, Consent,  Note Completion      Last Updated: 28-Apr-2023 09:45 by Aris Macias)

## 2023-09-25 ENCOUNTER — TELEPHONE (OUTPATIENT)
Dept: PRIMARY CARE | Facility: CLINIC | Age: 38
End: 2023-09-25

## 2023-09-25 ENCOUNTER — CLINICAL SUPPORT (OUTPATIENT)
Dept: PRIMARY CARE | Facility: CLINIC | Age: 38
End: 2023-09-25
Payer: COMMERCIAL

## 2023-09-25 VITALS — OXYGEN SATURATION: 97 % | HEART RATE: 88 BPM

## 2023-09-25 DIAGNOSIS — E53.8 VITAMIN B12 DEFICIENCY: ICD-10-CM

## 2023-09-25 PROCEDURE — 96372 THER/PROPH/DIAG INJ SC/IM: CPT | Performed by: NURSE PRACTITIONER

## 2023-09-25 RX ORDER — CYANOCOBALAMIN 1000 UG/ML
1000 INJECTION, SOLUTION INTRAMUSCULAR; SUBCUTANEOUS ONCE
Status: COMPLETED | OUTPATIENT
Start: 2023-09-25 | End: 2023-09-25

## 2023-09-25 RX ADMIN — CYANOCOBALAMIN 1000 MCG: 1000 INJECTION, SOLUTION INTRAMUSCULAR; SUBCUTANEOUS at 11:16

## 2023-09-25 NOTE — TELEPHONE ENCOUNTER
Patient needs a new letter stating that she needs a new RAMANDEEP letter stating  that she needs 5 dogs for support, etc, etc. Please call and mail when completed Address [address] [facility] [address]

## 2023-10-02 NOTE — OP NOTE
Post Operative Note:     PreOp Diagnosis: oab   Post-Procedure Diagnosis: same   Procedure: 1. stage 1 sacral neuromodulation   Surgeon: abdirizak   Resident/Fellow/Other Assistant: none   Estimated Blood Loss (mL): none   Specimen: no   Findings: <2.0 mA on all 4 leads     Operative Report Dictated:  Dictation: not applicable - note contains Operative  Report   Operative Report:    Under satisfactory intravenous sedation, in the prone position, the patient was prepped and draped in the usual sterile manner. Bony landmarks were used to identify  the approximate location of the S3 foramen on the left side. A spinal needle was used to find the correct spot for the S3 nerve after injecting local anesthetic over the site. Once the site was confirmed using sensory and motor response, as well as with  intraoperative fluoroscopy, the lead was placed into the S3 foramen using the kit designed for this purpose. The lead was then tunneled over to the left side where a small pocket was created. The temporary lead extender was then connected and passed across  and above the incision on the same side and exteriorized in the left buttock region. The left buttock pocket was irrigated with a dilute solution of Gentamycin. The subcutaneous tissue was reapproximated with 2-0 Vicryl. The skin was closed with a 4-0  subcuticular stitch. A simple stitch was placed over the midline site at the lead insertion. Sponge and instrument counts were correct.       Electronic Signatures:  Aris Macias)  (Signed 14-Apr-2023 16:29)   Authored: Post Operative Note, Note Completion      Last Updated: 14-Apr-2023 16:29 by Aris Macias)

## 2023-10-02 NOTE — OP NOTE
Post Operative Note:     PreOp Diagnosis: incomplete emptying   Post-Procedure Diagnosis: same   Procedure: 1. stage 2 sacral neuromodulation   Surgeon: abdirizak   Resident/Fellow/Other Assistant: adam   Estimated Blood Loss (mL): none   Specimen: no   Findings: no impedences     Operative Report Dictated:  Dictation: not applicable - note contains Operative  Report   Operative Report:    Under satisfactory intravenous sedation, in the prone position, the patient was prepped and draped in the usual sterile manner. The test stimulator box was disconnected  and amplitude was turned to the off position. The percutaneous lead extender was disconnected and discarded. Local injection was given over the prior injection. The prior incision was opened with a scalpel and carried down through the subcutaneous tissue  layers with care not to disturb the devices. The suture on the boot was cut. The boot was removed from the lead, exposing the screws. The screws were loosened with the wrench. The lead was retracted away from the connector block. The boot was then discarded.  The braided area of the percutaneous extender was cut below the connector block and discarded. Using the same incision site, a pocket was created bluntly about 1 inch in depth. This pouch was irrigated with Gentamycin solution combined with sterile water.  Taking care to keep the lead contact points dry, the lead was inserted into internal neurostimulator (internal pulse generator (IPG)) until the point the blue end was confirmed to be seen extending all the way inside the internal pulse generator. The  screws were secured in place. The IPG was then inserted into the tissue pocket in proper orientation. Irrigation was again performed with the Gentamycin solution. The subcutaneous tissue was reapproximated with 2-0 Vicryl. The skin was closed with a 4-0  subcuticular stitch. A simple stitch was placed over the midline site at the lead insertion. Sponge  and instrument counts were correct.   At The patient tolerated the procedure well, and was transferred to the recovery room in excellent condition. In the PACU the neurostimulator was programmed to the hand-held device.       Electronic Signatures:  Aris Macias)  (Signed 28-Apr-2023 10:31)   Authored: Post Operative Note, Note Completion      Last Updated: 28-Apr-2023 10:31 by Aris Macias)

## 2023-10-16 ENCOUNTER — TELEMEDICINE (OUTPATIENT)
Dept: BEHAVIORAL HEALTH | Facility: CLINIC | Age: 38
End: 2023-10-16
Payer: COMMERCIAL

## 2023-10-16 DIAGNOSIS — F44.4 FUNCTIONAL NEUROLOGICAL SYMPTOM DISORDER (CONVERSION DISORDER), WITH ABNORMAL MOVEMENT: ICD-10-CM

## 2023-10-16 DIAGNOSIS — F41.9 ANXIETY: ICD-10-CM

## 2023-10-16 DIAGNOSIS — F43.21 GRIEF: ICD-10-CM

## 2023-10-16 PROCEDURE — 99214 OFFICE O/P EST MOD 30 MIN: CPT | Performed by: PSYCHIATRY & NEUROLOGY

## 2023-10-16 NOTE — PATIENT INSTRUCTIONS
Plan:   - Continue cymbalta 60mg once daily.   - Continue counseling - work with your counselor on ways to improve your motivation to do things.   - Follow up in about 5 months.   - Call sooner (main: [phone]) in case of any questions or concerns.  - Call [phone] for mental health crisis or suicidal thoughts, or call 911 or go to the nearest emergency room for emergencies.

## 2023-10-24 DIAGNOSIS — K21.9 GASTROESOPHAGEAL REFLUX DISEASE WITHOUT ESOPHAGITIS: ICD-10-CM

## 2023-10-24 RX ORDER — PANTOPRAZOLE SODIUM 40 MG/1
40 TABLET, DELAYED RELEASE ORAL DAILY
Qty: 90 TABLET | Refills: 2 | Status: SHIPPED | OUTPATIENT
Start: 2023-10-24

## 2023-10-25 ENCOUNTER — CLINICAL SUPPORT (OUTPATIENT)
Dept: PRIMARY CARE | Facility: CLINIC | Age: 38
End: 2023-10-25
Payer: COMMERCIAL

## 2023-10-25 DIAGNOSIS — E53.8 LOW SERUM VITAMIN B12: Primary | ICD-10-CM

## 2023-10-25 PROCEDURE — 96372 THER/PROPH/DIAG INJ SC/IM: CPT | Performed by: NURSE PRACTITIONER

## 2023-10-25 RX ORDER — CYANOCOBALAMIN 1000 UG/ML
1000 INJECTION, SOLUTION INTRAMUSCULAR; SUBCUTANEOUS ONCE
Status: COMPLETED | OUTPATIENT
Start: 2023-10-25 | End: 2023-10-25

## 2023-10-25 RX ADMIN — CYANOCOBALAMIN 1000 MCG: 1000 INJECTION, SOLUTION INTRAMUSCULAR; SUBCUTANEOUS at 11:55

## 2023-10-31 ENCOUNTER — TELEMEDICINE (OUTPATIENT)
Dept: UROLOGY | Facility: CLINIC | Age: 38
End: 2023-10-31
Payer: COMMERCIAL

## 2023-10-31 DIAGNOSIS — N32.81 OAB (OVERACTIVE BLADDER): Primary | ICD-10-CM

## 2023-10-31 PROCEDURE — 99213 OFFICE O/P EST LOW 20 MIN: CPT | Performed by: STUDENT IN AN ORGANIZED HEALTH CARE EDUCATION/TRAINING PROGRAM

## 2023-10-31 NOTE — PROGRESS NOTES
HISTORY OF PRESENT ILLNESS:  Doing great with device, still on cycling          Past Medical History  She has a past medical history of Acute left ankle pain (05/19/2023), Back pain, thoracic (05/19/2023), Calcium kidney stone (05/19/2023), Calculus of kidney (01/09/2019), Conversion disorder (05/19/2023), Degeneration of intervertebral disc of lumbar region (05/19/2023), Depression (05/19/2023), Diffuse pain in left lower extremity (05/19/2023), Dysuria (05/19/2023), Elevated liver transaminase level (05/19/2023), Fatigue (05/19/2023), Food insecurity (05/19/2023), Frequent headaches (05/19/2023), Frequent urinary incontinence (05/19/2023), Functional neurological symptom disorder with mixed symptoms (05/19/2023), Left flank pain (05/19/2023), Left foot pain (05/19/2023), Left hand pain (05/19/2023), Left sided abdominal pain (05/19/2023), Leg dysfunction (05/19/2023), Lower back pain (05/19/2023), Lumbar radiculopathy, chronic (05/19/2023), Lumbar spondylosis (05/19/2023), Median neuropathy of both upper extremities (05/19/2023), Nonepileptic episode (CMS/HCC) (05/19/2023), Osteochondral lesion (05/19/2023), Other chronic diseases of tonsils and adenoids (11/29/2018), Other chronic diseases of tonsils and adenoids (02/15/2019), Pain in left knee (05/19/2023), Personal history of other diseases of the nervous system and sense organs, Personal history of other diseases of the respiratory system (03/17/2019), Personal history of other diseases of the respiratory system (12/06/2018), Polyneuropathy, unspecified (05/19/2023), Rectal bleeding (05/19/2023), Stress reaction, chronic (05/19/2023), Tear of medial meniscus of left knee, current (05/19/2023), Tendonitis (05/19/2023), Tendonitis, adhesive (05/19/2023), UTI (urinary tract infection) (05/19/2023), Weakness of extremity (05/19/2023), Weight gain (05/19/2023), Wrist sprain, left, initial encounter (05/19/2023), and Yeast infection  (05/19/2023).    Surgical History  She has a past surgical history that includes Tubal ligation (04/09/2018); Other surgical history (06/01/2021); Other surgical history (11/09/2020); and Other surgical history (09/23/2019).     Social History  She reports that she has never smoked. She has never used smokeless tobacco. No history on file for alcohol use and drug use.    Family History  Family History   Problem Relation Name Age of Onset    Other (brain lesion) Mother      Asthma Mother      Other (cerebrovascular accident) Mother      Multiple sclerosis Mother      Seizures Mother      Other (mental illness in member of household) Mother      Tremor Sister          Allergies  Bee pollen, Bee venom protein (honey bee), and Beeswax      A comprehensive 10+ review of systems was negative except for: see hpi                     Assessment:    36-year-old status post sling for stress incontinence doing well from that standpoint but with OAB sx     OAB:  Failed Myrbetriq, oxybutynin  UDS demonstrates stress incontinence with high pressure but no obstruction   S/P Botox, 12/20/2022, 100 units no improvement   s/p stage 1 and 2 axonics 4/28/23 with >80% improvement, satisfied with her treatment, currently on cycling     Follow up in  1 year        Aris Macias MD

## 2023-11-04 ENCOUNTER — LAB (OUTPATIENT)
Dept: LAB | Facility: LAB | Age: 38
End: 2023-11-04
Payer: COMMERCIAL

## 2023-11-04 DIAGNOSIS — Z13.29 SCREENING FOR THYROID DISORDER: ICD-10-CM

## 2023-11-04 DIAGNOSIS — E53.8 LOW SERUM VITAMIN B12: ICD-10-CM

## 2023-11-04 DIAGNOSIS — E11.65 TYPE 2 DIABETES MELLITUS WITH HYPERGLYCEMIA, WITHOUT LONG-TERM CURRENT USE OF INSULIN (MULTI): ICD-10-CM

## 2023-11-04 DIAGNOSIS — E78.5 DYSLIPIDEMIA: ICD-10-CM

## 2023-11-04 LAB
ALBUMIN SERPL BCP-MCNC: 4.2 G/DL (ref 3.4–5)
ALP SERPL-CCNC: 49 U/L (ref 33–110)
ALT SERPL W P-5'-P-CCNC: 10 U/L (ref 7–45)
ANION GAP SERPL CALC-SCNC: 12 MMOL/L (ref 10–20)
AST SERPL W P-5'-P-CCNC: 13 U/L (ref 9–39)
BILIRUB SERPL-MCNC: 0.8 MG/DL (ref 0–1.2)
BUN SERPL-MCNC: 8 MG/DL (ref 6–23)
CALCIUM SERPL-MCNC: 9.1 MG/DL (ref 8.6–10.3)
CHLORIDE SERPL-SCNC: 107 MMOL/L (ref 98–107)
CO2 SERPL-SCNC: 23 MMOL/L (ref 21–32)
CREAT SERPL-MCNC: 0.84 MG/DL (ref 0.5–1.05)
ERYTHROCYTE [DISTWIDTH] IN BLOOD BY AUTOMATED COUNT: 12.9 % (ref 11.5–14.5)
EST. AVERAGE GLUCOSE BLD GHB EST-MCNC: 100 MG/DL
GFR SERPL CREATININE-BSD FRML MDRD: >90 ML/MIN/1.73M*2
GLUCOSE SERPL-MCNC: 77 MG/DL (ref 74–99)
HBA1C MFR BLD: 5.1 %
HCT VFR BLD AUTO: 42.9 % (ref 36–46)
HGB BLD-MCNC: 13.5 G/DL (ref 12–16)
MCH RBC QN AUTO: 28.2 PG (ref 26–34)
MCHC RBC AUTO-ENTMCNC: 31.5 G/DL (ref 32–36)
MCV RBC AUTO: 90 FL (ref 80–100)
NRBC BLD-RTO: 0 /100 WBCS (ref 0–0)
PLATELET # BLD AUTO: 339 X10*3/UL (ref 150–450)
POTASSIUM SERPL-SCNC: 4.4 MMOL/L (ref 3.5–5.3)
PROT SERPL-MCNC: 7 G/DL (ref 6.4–8.2)
RBC # BLD AUTO: 4.79 X10*6/UL (ref 4–5.2)
SODIUM SERPL-SCNC: 138 MMOL/L (ref 136–145)
TSH SERPL-ACNC: 1.23 MIU/L (ref 0.44–3.98)
VIT B12 SERPL-MCNC: 439 PG/ML (ref 211–911)
WBC # BLD AUTO: 9 X10*3/UL (ref 4.4–11.3)

## 2023-11-04 PROCEDURE — 83036 HEMOGLOBIN GLYCOSYLATED A1C: CPT

## 2023-11-04 PROCEDURE — 85027 COMPLETE CBC AUTOMATED: CPT

## 2023-11-04 PROCEDURE — 36415 COLL VENOUS BLD VENIPUNCTURE: CPT

## 2023-11-04 PROCEDURE — 82607 VITAMIN B-12: CPT

## 2023-11-04 PROCEDURE — 80053 COMPREHEN METABOLIC PANEL: CPT

## 2023-11-04 PROCEDURE — 84443 ASSAY THYROID STIM HORMONE: CPT

## 2023-11-13 DIAGNOSIS — B37.9 YEAST INFECTION: Primary | ICD-10-CM

## 2023-11-13 RX ORDER — FLUCONAZOLE 150 MG/1
TABLET ORAL
Qty: 2 TABLET | Refills: 6 | Status: SHIPPED | OUTPATIENT
Start: 2023-11-13

## 2023-11-22 ENCOUNTER — OFFICE VISIT (OUTPATIENT)
Dept: PRIMARY CARE | Facility: CLINIC | Age: 38
End: 2023-11-22
Payer: COMMERCIAL

## 2023-11-22 VITALS
WEIGHT: 152 LBS | HEART RATE: 82 BPM | SYSTOLIC BLOOD PRESSURE: 118 MMHG | HEIGHT: 64 IN | BODY MASS INDEX: 25.95 KG/M2 | DIASTOLIC BLOOD PRESSURE: 68 MMHG | RESPIRATION RATE: 16 BRPM

## 2023-11-22 DIAGNOSIS — E11.65 TYPE 2 DIABETES MELLITUS WITH HYPERGLYCEMIA, WITHOUT LONG-TERM CURRENT USE OF INSULIN (MULTI): Primary | ICD-10-CM

## 2023-11-22 DIAGNOSIS — R26.9 GAIT DISORDER: ICD-10-CM

## 2023-11-22 DIAGNOSIS — E53.8 LOW SERUM VITAMIN B12: ICD-10-CM

## 2023-11-22 DIAGNOSIS — Z00.00 MEDICARE ANNUAL WELLNESS VISIT, SUBSEQUENT: ICD-10-CM

## 2023-11-22 DIAGNOSIS — E78.5 DYSLIPIDEMIA: ICD-10-CM

## 2023-11-22 DIAGNOSIS — E55.9 VITAMIN D DEFICIENCY: ICD-10-CM

## 2023-11-22 PROBLEM — R73.9 HYPERGLYCEMIA: Status: RESOLVED | Noted: 2023-09-13 | Resolved: 2023-11-22

## 2023-11-22 PROCEDURE — 3008F BODY MASS INDEX DOCD: CPT | Performed by: NURSE PRACTITIONER

## 2023-11-22 PROCEDURE — 3074F SYST BP LT 130 MM HG: CPT | Performed by: NURSE PRACTITIONER

## 2023-11-22 PROCEDURE — 3044F HG A1C LEVEL LT 7.0%: CPT | Performed by: NURSE PRACTITIONER

## 2023-11-22 PROCEDURE — 3078F DIAST BP <80 MM HG: CPT | Performed by: NURSE PRACTITIONER

## 2023-11-22 PROCEDURE — 3066F NEPHROPATHY DOC TX: CPT | Performed by: NURSE PRACTITIONER

## 2023-11-22 PROCEDURE — 99214 OFFICE O/P EST MOD 30 MIN: CPT | Performed by: NURSE PRACTITIONER

## 2023-11-22 PROCEDURE — 1036F TOBACCO NON-USER: CPT | Performed by: NURSE PRACTITIONER

## 2023-11-22 ASSESSMENT — COLUMBIA-SUICIDE SEVERITY RATING SCALE - C-SSRS
2. HAVE YOU ACTUALLY HAD ANY THOUGHTS OF KILLING YOURSELF?: NO
6. HAVE YOU EVER DONE ANYTHING, STARTED TO DO ANYTHING, OR PREPARED TO DO ANYTHING TO END YOUR LIFE?: NO
1. IN THE PAST MONTH, HAVE YOU WISHED YOU WERE DEAD OR WISHED YOU COULD GO TO SLEEP AND NOT WAKE UP?: NO

## 2023-11-22 ASSESSMENT — ANXIETY QUESTIONNAIRES
6. BECOMING EASILY ANNOYED OR IRRITABLE: NOT AT ALL
2. NOT BEING ABLE TO STOP OR CONTROL WORRYING: NOT AT ALL
1. FEELING NERVOUS, ANXIOUS, OR ON EDGE: NOT AT ALL
7. FEELING AFRAID AS IF SOMETHING AWFUL MIGHT HAPPEN: NOT AT ALL
GAD7 TOTAL SCORE: 0
3. WORRYING TOO MUCH ABOUT DIFFERENT THINGS: NOT AT ALL
IF YOU CHECKED OFF ANY PROBLEMS ON THIS QUESTIONNAIRE, HOW DIFFICULT HAVE THESE PROBLEMS MADE IT FOR YOU TO DO YOUR WORK, TAKE CARE OF THINGS AT HOME, OR GET ALONG WITH OTHER PEOPLE: NOT DIFFICULT AT ALL
5. BEING SO RESTLESS THAT IT IS HARD TO SIT STILL: NOT AT ALL
4. TROUBLE RELAXING: NOT AT ALL

## 2023-11-22 ASSESSMENT — PATIENT HEALTH QUESTIONNAIRE - PHQ9
2. FEELING DOWN, DEPRESSED OR HOPELESS: NOT AT ALL
1. LITTLE INTEREST OR PLEASURE IN DOING THINGS: NOT AT ALL
SUM OF ALL RESPONSES TO PHQ9 QUESTIONS 1 AND 2: 0

## 2023-11-22 ASSESSMENT — ENCOUNTER SYMPTOMS
LOSS OF SENSATION IN FEET: 0
DEPRESSION: 0
OCCASIONAL FEELINGS OF UNSTEADINESS: 0

## 2023-11-22 NOTE — PATIENT INSTRUCTIONS
I congratulate you for weight loss. I have discontinued your Metformin and ordered labs to be completed a week prior to follow up in 4 months for annual medicare wellness exam.

## 2023-11-22 NOTE — PROGRESS NOTES
"Subjective   Patient ID: Nilsa Post is a 37 y.o. female who presents for Follow-up.    Patient using a walker for assistance with ambulation is following up for lab results review and management of multiple chronic diseases.  Her lab results are unremarkable with hemoglobin A1c down to 5.1%.  Patient has lost over 50 pounds since started on Rybelsus.  Advises she takes her medications as prescribed with no side effects noted.  Reports that she feels great and denies acute medical complaint.         Review of Systems   All other systems reviewed and are negative.      Objective   /68   Pulse 82   Resp 16   Ht 1.626 m (5' 4\")   Wt 68.9 kg (152 lb)   BMI 26.09 kg/m²     Physical Exam  Constitutional:       Appearance: Normal appearance.   HENT:      Head: Normocephalic and atraumatic.      Right Ear: External ear normal.      Left Ear: External ear normal.      Nose: Nose normal.      Mouth/Throat:      Mouth: Mucous membranes are moist.   Cardiovascular:      Rate and Rhythm: Normal rate and regular rhythm.      Pulses: Normal pulses.      Heart sounds: Normal heart sounds.   Pulmonary:      Effort: Pulmonary effort is normal.      Breath sounds: Normal breath sounds.   Abdominal:      General: Bowel sounds are normal.      Palpations: Abdomen is soft.   Musculoskeletal:      Cervical back: Neck supple.   Skin:     General: Skin is warm and dry.   Neurological:      Mental Status: She is alert and oriented to person, place, and time. Mental status is at baseline.   Psychiatric:         Mood and Affect: Mood normal.         Behavior: Behavior normal.         Thought Content: Thought content normal.         Judgment: Judgment normal.         Assessment/Plan   Problem List Items Addressed This Visit       Dyslipidemia    Relevant Orders    Lipid panel    Type 2 diabetes mellitus with hyperglycemia, without long-term current use of insulin (CMS/[facility])    Relevant Orders    Hemoglobin A1C    Comprehensive " Metabolic Panel     Other Visit Diagnoses       Medicare annual wellness visit, subsequent    -  Primary    Relevant Orders    Follow Up In [facility] - PCP - Medicare Annual

## 2023-11-27 ENCOUNTER — CLINICAL SUPPORT (OUTPATIENT)
Dept: PRIMARY CARE | Facility: CLINIC | Age: 38
End: 2023-11-27
Payer: COMMERCIAL

## 2023-11-27 DIAGNOSIS — E53.8 VITAMIN B12 DEFICIENCY: ICD-10-CM

## 2023-11-27 PROCEDURE — 96372 THER/PROPH/DIAG INJ SC/IM: CPT | Performed by: NURSE PRACTITIONER

## 2023-11-27 RX ORDER — CYANOCOBALAMIN 1000 UG/ML
1000 INJECTION, SOLUTION INTRAMUSCULAR; SUBCUTANEOUS ONCE
Status: COMPLETED | OUTPATIENT
Start: 2023-11-27 | End: 2023-11-27

## 2023-11-27 RX ADMIN — CYANOCOBALAMIN 1000 MCG: 1000 INJECTION, SOLUTION INTRAMUSCULAR; SUBCUTANEOUS at 10:58

## 2023-12-27 ENCOUNTER — CLINICAL SUPPORT (OUTPATIENT)
Dept: PRIMARY CARE | Facility: CLINIC | Age: 38
End: 2023-12-27
Payer: COMMERCIAL

## 2023-12-27 DIAGNOSIS — E53.8 LOW SERUM VITAMIN B12: Primary | ICD-10-CM

## 2023-12-27 PROCEDURE — 96372 THER/PROPH/DIAG INJ SC/IM: CPT | Performed by: NURSE PRACTITIONER

## 2023-12-27 RX ORDER — CYANOCOBALAMIN 1000 UG/ML
1000 INJECTION, SOLUTION INTRAMUSCULAR; SUBCUTANEOUS ONCE
Status: COMPLETED | OUTPATIENT
Start: 2023-12-27 | End: 2023-12-27

## 2023-12-27 RX ADMIN — CYANOCOBALAMIN 1000 MCG: 1000 INJECTION, SOLUTION INTRAMUSCULAR; SUBCUTANEOUS at 12:53

## 2024-01-26 ENCOUNTER — APPOINTMENT (OUTPATIENT)
Dept: PRIMARY CARE | Facility: CLINIC | Age: 39
End: 2024-01-26
Payer: MEDICARE

## 2024-01-29 ENCOUNTER — TELEPHONE (OUTPATIENT)
Dept: PRIMARY CARE | Facility: CLINIC | Age: 39
End: 2024-01-29
Payer: MEDICARE

## 2024-01-29 ENCOUNTER — TELEPHONE (OUTPATIENT)
Dept: OTHER | Age: 39
End: 2024-01-29
Payer: MEDICARE

## 2024-01-29 DIAGNOSIS — E11.65 TYPE 2 DIABETES MELLITUS WITH HYPERGLYCEMIA, WITHOUT LONG-TERM CURRENT USE OF INSULIN (MULTI): ICD-10-CM

## 2024-01-29 DIAGNOSIS — F41.9 ANXIETY: ICD-10-CM

## 2024-01-29 NOTE — TELEPHONE ENCOUNTER
Caller: pt    Medication:  Duloxetine 60 mgs    Pharmacy:  [place] [phone]    Next visit: 3.22.24

## 2024-01-30 RX ORDER — DULOXETIN HYDROCHLORIDE 60 MG/1
60 CAPSULE, DELAYED RELEASE ORAL DAILY
Qty: 30 CAPSULE | Refills: 5 | Status: SHIPPED | OUTPATIENT
Start: 2024-01-30

## 2024-02-27 ENCOUNTER — CLINICAL SUPPORT (OUTPATIENT)
Dept: PRIMARY CARE | Facility: CLINIC | Age: 39
End: 2024-02-27
Payer: MEDICARE

## 2024-02-27 DIAGNOSIS — E53.8 LOW SERUM VITAMIN B12: Primary | ICD-10-CM

## 2024-02-27 PROCEDURE — 96372 THER/PROPH/DIAG INJ SC/IM: CPT | Performed by: NURSE PRACTITIONER

## 2024-02-27 RX ORDER — CYANOCOBALAMIN 1000 UG/ML
1000 INJECTION, SOLUTION INTRAMUSCULAR; SUBCUTANEOUS ONCE
Status: COMPLETED | OUTPATIENT
Start: 2024-02-27 | End: 2024-02-27

## 2024-02-27 RX ADMIN — CYANOCOBALAMIN 1000 MCG: 1000 INJECTION, SOLUTION INTRAMUSCULAR; SUBCUTANEOUS at 15:12

## 2024-03-13 ENCOUNTER — LAB (OUTPATIENT)
Dept: LAB | Facility: LAB | Age: 39
End: 2024-03-13
Payer: MEDICARE

## 2024-03-13 DIAGNOSIS — E78.5 DYSLIPIDEMIA: ICD-10-CM

## 2024-03-13 DIAGNOSIS — E55.9 VITAMIN D DEFICIENCY: ICD-10-CM

## 2024-03-13 DIAGNOSIS — E11.65 TYPE 2 DIABETES MELLITUS WITH HYPERGLYCEMIA, WITHOUT LONG-TERM CURRENT USE OF INSULIN (MULTI): ICD-10-CM

## 2024-03-13 LAB
25(OH)D3 SERPL-MCNC: 23 NG/ML (ref 30–100)
ALBUMIN SERPL BCP-MCNC: 4.1 G/DL (ref 3.4–5)
ALP SERPL-CCNC: 45 U/L (ref 33–110)
ALT SERPL W P-5'-P-CCNC: 8 U/L (ref 7–45)
ANION GAP SERPL CALC-SCNC: 11 MMOL/L (ref 10–20)
APPEARANCE UR: ABNORMAL
AST SERPL W P-5'-P-CCNC: 12 U/L (ref 9–39)
BILIRUB SERPL-MCNC: 0.5 MG/DL (ref 0–1.2)
BILIRUB UR STRIP.AUTO-MCNC: NEGATIVE MG/DL
BUN SERPL-MCNC: 11 MG/DL (ref 6–23)
CALCIUM SERPL-MCNC: 9.2 MG/DL (ref 8.6–10.3)
CHLORIDE SERPL-SCNC: 104 MMOL/L (ref 98–107)
CHOLEST SERPL-MCNC: 200 MG/DL (ref 0–199)
CHOLESTEROL/HDL RATIO: 4.7
CO2 SERPL-SCNC: 26 MMOL/L (ref 21–32)
COLOR UR: YELLOW
CREAT SERPL-MCNC: 0.93 MG/DL (ref 0.5–1.05)
CREAT UR-MCNC: 254.3 MG/DL (ref 20–320)
EGFRCR SERPLBLD CKD-EPI 2021: 82 ML/MIN/1.73M*2
GLUCOSE SERPL-MCNC: 76 MG/DL (ref 74–99)
GLUCOSE UR STRIP.AUTO-MCNC: NEGATIVE MG/DL
HDLC SERPL-MCNC: 42.3 MG/DL
KETONES UR STRIP.AUTO-MCNC: NEGATIVE MG/DL
LDLC SERPL CALC-MCNC: 119 MG/DL
LEUKOCYTE ESTERASE UR QL STRIP.AUTO: NEGATIVE
MICROALBUMIN UR-MCNC: 11.6 MG/L
MICROALBUMIN/CREAT UR: 4.6 UG/MG CREAT
NITRITE UR QL STRIP.AUTO: NEGATIVE
NON HDL CHOLESTEROL: 158 MG/DL (ref 0–149)
PH UR STRIP.AUTO: 5 [PH]
POTASSIUM SERPL-SCNC: 4.5 MMOL/L (ref 3.5–5.3)
PROT SERPL-MCNC: 7.7 G/DL (ref 6.4–8.2)
PROT UR STRIP.AUTO-MCNC: NEGATIVE MG/DL
RBC # UR STRIP.AUTO: NEGATIVE /UL
SODIUM SERPL-SCNC: 136 MMOL/L (ref 136–145)
SP GR UR STRIP.AUTO: 1.03
TRIGL SERPL-MCNC: 195 MG/DL (ref 0–149)
UROBILINOGEN UR STRIP.AUTO-MCNC: <2 MG/DL
VLDL: 39 MG/DL (ref 0–40)

## 2024-03-13 PROCEDURE — 82306 VITAMIN D 25 HYDROXY: CPT

## 2024-03-13 PROCEDURE — 83036 HEMOGLOBIN GLYCOSYLATED A1C: CPT

## 2024-03-13 PROCEDURE — 80053 COMPREHEN METABOLIC PANEL: CPT

## 2024-03-13 PROCEDURE — 82570 ASSAY OF URINE CREATININE: CPT

## 2024-03-13 PROCEDURE — 80061 LIPID PANEL: CPT

## 2024-03-13 PROCEDURE — 81003 URINALYSIS AUTO W/O SCOPE: CPT

## 2024-03-13 PROCEDURE — 36415 COLL VENOUS BLD VENIPUNCTURE: CPT

## 2024-03-13 PROCEDURE — 82043 UR ALBUMIN QUANTITATIVE: CPT

## 2024-03-14 ENCOUNTER — OFFICE VISIT (OUTPATIENT)
Dept: PRIMARY CARE | Facility: CLINIC | Age: 39
End: 2024-03-14
Payer: MEDICARE

## 2024-03-14 VITALS
DIASTOLIC BLOOD PRESSURE: 78 MMHG | SYSTOLIC BLOOD PRESSURE: 118 MMHG | BODY MASS INDEX: 26.63 KG/M2 | OXYGEN SATURATION: 98 % | HEART RATE: 89 BPM | WEIGHT: 156 LBS | HEIGHT: 64 IN | RESPIRATION RATE: 16 BRPM

## 2024-03-14 DIAGNOSIS — E53.8 LOW SERUM VITAMIN B12: ICD-10-CM

## 2024-03-14 DIAGNOSIS — E11.65 TYPE 2 DIABETES MELLITUS WITH HYPERGLYCEMIA, WITHOUT LONG-TERM CURRENT USE OF INSULIN (MULTI): ICD-10-CM

## 2024-03-14 DIAGNOSIS — F32.5 MAJOR DEPRESSIVE DISORDER WITH SINGLE EPISODE, IN FULL REMISSION (CMS-HCC): ICD-10-CM

## 2024-03-14 DIAGNOSIS — E55.9 VITAMIN D DEFICIENCY: ICD-10-CM

## 2024-03-14 DIAGNOSIS — R56.9 SEIZURE-LIKE ACTIVITY (MULTI): ICD-10-CM

## 2024-03-14 DIAGNOSIS — Z00.00 MEDICARE ANNUAL WELLNESS VISIT, SUBSEQUENT: Primary | ICD-10-CM

## 2024-03-14 DIAGNOSIS — K21.9 GASTRO-ESOPHAGEAL REFLUX DISEASE WITHOUT ESOPHAGITIS: ICD-10-CM

## 2024-03-14 DIAGNOSIS — E78.5 DYSLIPIDEMIA: ICD-10-CM

## 2024-03-14 LAB
EST. AVERAGE GLUCOSE BLD GHB EST-MCNC: 105 MG/DL
HBA1C MFR BLD: 5.3 %

## 2024-03-14 PROCEDURE — 3061F NEG MICROALBUMINURIA REV: CPT | Performed by: NURSE PRACTITIONER

## 2024-03-14 PROCEDURE — 96372 THER/PROPH/DIAG INJ SC/IM: CPT | Performed by: NURSE PRACTITIONER

## 2024-03-14 PROCEDURE — 3049F LDL-C 100-129 MG/DL: CPT | Performed by: NURSE PRACTITIONER

## 2024-03-14 PROCEDURE — G0439 PPPS, SUBSEQ VISIT: HCPCS | Performed by: NURSE PRACTITIONER

## 2024-03-14 PROCEDURE — 99214 OFFICE O/P EST MOD 30 MIN: CPT | Performed by: NURSE PRACTITIONER

## 2024-03-14 PROCEDURE — 3074F SYST BP LT 130 MM HG: CPT | Performed by: NURSE PRACTITIONER

## 2024-03-14 PROCEDURE — 1036F TOBACCO NON-USER: CPT | Performed by: NURSE PRACTITIONER

## 2024-03-14 PROCEDURE — 3044F HG A1C LEVEL LT 7.0%: CPT | Performed by: NURSE PRACTITIONER

## 2024-03-14 PROCEDURE — 3078F DIAST BP <80 MM HG: CPT | Performed by: NURSE PRACTITIONER

## 2024-03-14 PROCEDURE — G0444 DEPRESSION SCREEN ANNUAL: HCPCS | Performed by: NURSE PRACTITIONER

## 2024-03-14 PROCEDURE — 3008F BODY MASS INDEX DOCD: CPT | Performed by: NURSE PRACTITIONER

## 2024-03-14 RX ORDER — CYANOCOBALAMIN 1000 UG/ML
1000 INJECTION, SOLUTION INTRAMUSCULAR; SUBCUTANEOUS ONCE
Status: COMPLETED | OUTPATIENT
Start: 2024-03-14 | End: 2024-03-14

## 2024-03-14 RX ORDER — ERGOCALCIFEROL 1.25 MG/1
50000 CAPSULE ORAL
Qty: 12 CAPSULE | Refills: 2 | Status: SHIPPED | OUTPATIENT
Start: 2024-03-14 | End: 2024-12-09

## 2024-03-14 RX ORDER — ROSUVASTATIN CALCIUM 40 MG/1
40 TABLET, COATED ORAL DAILY
Qty: 100 TABLET | Refills: 3 | Status: SHIPPED | OUTPATIENT
Start: 2024-03-14 | End: 2025-04-18

## 2024-03-14 RX ADMIN — CYANOCOBALAMIN 1000 MCG: 1000 INJECTION, SOLUTION INTRAMUSCULAR; SUBCUTANEOUS at 11:41

## 2024-03-14 ASSESSMENT — ACTIVITIES OF DAILY LIVING (ADL)
GROCERY_SHOPPING: INDEPENDENT
MANAGING_FINANCES: INDEPENDENT
DRESSING: INDEPENDENT
DOING_HOUSEWORK: INDEPENDENT
BATHING: INDEPENDENT
TAKING_MEDICATION: INDEPENDENT
MANAGING_FINANCES: INDEPENDENT
GROCERY_SHOPPING: INDEPENDENT
TAKING_MEDICATION: INDEPENDENT
DOING_HOUSEWORK: NEEDS ASSISTANCE

## 2024-03-14 ASSESSMENT — ENCOUNTER SYMPTOMS
LOSS OF SENSATION IN FEET: 0
OCCASIONAL FEELINGS OF UNSTEADINESS: 0
DEPRESSION: 0

## 2024-03-14 ASSESSMENT — ANXIETY QUESTIONNAIRES
5. BEING SO RESTLESS THAT IT IS HARD TO SIT STILL: NOT AT ALL
4. TROUBLE RELAXING: NOT AT ALL
7. FEELING AFRAID AS IF SOMETHING AWFUL MIGHT HAPPEN: NOT AT ALL
GAD7 TOTAL SCORE: 0
3. WORRYING TOO MUCH ABOUT DIFFERENT THINGS: NOT AT ALL
1. FEELING NERVOUS, ANXIOUS, OR ON EDGE: NOT AT ALL
2. NOT BEING ABLE TO STOP OR CONTROL WORRYING: NOT AT ALL
IF YOU CHECKED OFF ANY PROBLEMS ON THIS QUESTIONNAIRE, HOW DIFFICULT HAVE THESE PROBLEMS MADE IT FOR YOU TO DO YOUR WORK, TAKE CARE OF THINGS AT HOME, OR GET ALONG WITH OTHER PEOPLE: NOT DIFFICULT AT ALL
6. BECOMING EASILY ANNOYED OR IRRITABLE: NOT AT ALL

## 2024-03-14 ASSESSMENT — PATIENT HEALTH QUESTIONNAIRE - PHQ9
SUM OF ALL RESPONSES TO PHQ9 QUESTIONS 1 AND 2: 0
1. LITTLE INTEREST OR PLEASURE IN DOING THINGS: NOT AT ALL
2. FEELING DOWN, DEPRESSED OR HOPELESS: NOT AT ALL

## 2024-03-14 NOTE — PATIENT INSTRUCTIONS
I have increased your Rosuvastatin to 40mg daily and started you on vitamin D2 50,000unit weekly. Complete labs a week prior to 6 months follow up.

## 2024-03-14 NOTE — PROGRESS NOTES
"Subjective   Reason for Visit: Nilsa Post is an 38 y.o. female here for a Medicare Wellness visit.     Past Medical, Surgical, and Family History reviewed and updated in chart.    Reviewed all medications by prescribing practitioner or clinical pharmacist (such as prescriptions, OTCs, herbal therapies and supplements) and documented in the medical record.    Patient is also following up for lab results review and management of multiple chronic diseases.  Her lab results are unremarkable besides low serum vitamin D level at 23, slightly elevated LDL level at 119 and elevated triglyceride level at 195.  She is currently on rosuvastatin 20 mg daily for management of cholesterol levels.  Advises she takes her medications as prescribed and her symptoms are controlled no side effect noted.  She denies acute medical complaint.        Patient Care Team:  CARLOS Peck as PCP - General (Family Medicine)  CARLOS Peck as PCP - [employer] PCP  CARLOS Peck as PCP - [facility] PCP  CARLOS Peck as PCP - [employer] PCP     Review of Systems   All other systems reviewed and are negative.      Objective   Vitals:  /78   Pulse 89   Resp 16   Ht 1.626 m (5' 4\")   Wt 70.8 kg (156 lb)   SpO2 98%   BMI 26.78 kg/m²       Physical Exam  Vitals reviewed.   Constitutional:       Appearance: Normal appearance.   HENT:      Head: Normocephalic and atraumatic.      Right Ear: External ear normal.      Left Ear: External ear normal.      Nose: Nose normal.      Mouth/Throat:      Mouth: Mucous membranes are moist.   Eyes:      Extraocular Movements: Extraocular movements intact.      Conjunctiva/sclera: Conjunctivae normal.      Pupils: Pupils are equal, round, and reactive to light.   Cardiovascular:      Rate and Rhythm: Normal rate and regular rhythm.      Pulses: Normal pulses.      Heart sounds: Normal heart sounds.   Pulmonary:      " Effort: Pulmonary effort is normal.      Breath sounds: Normal breath sounds.   Abdominal:      General: Bowel sounds are normal.      Palpations: Abdomen is soft.   Musculoskeletal:      Cervical back: Neck supple.   Skin:     General: Skin is warm and dry.   Neurological:      Mental Status: She is alert and oriented to person, place, and time. Mental status is at baseline.   Psychiatric:         Mood and Affect: Mood normal.         Behavior: Behavior normal.         Thought Content: Thought content normal.         Judgment: Judgment normal.         Assessment/Plan   Problem List Items Addressed This Visit       Low serum vitamin B12 - Primary    Relevant Medications    cyanocobalamin (Vitamin B-12) injection 1,000 mcg (Completed) (Start on 3/14/2024 12:00 PM)    Medicare annual wellness visit, subsequent     Other Visit Diagnoses       Routine general medical examination at health care facility

## 2024-03-22 ENCOUNTER — TELEMEDICINE (OUTPATIENT)
Dept: BEHAVIORAL HEALTH | Facility: CLINIC | Age: 39
End: 2024-03-22
Payer: MEDICARE

## 2024-03-22 DIAGNOSIS — F41.9 ANXIETY: ICD-10-CM

## 2024-03-22 DIAGNOSIS — F44.4 FUNCTIONAL NEUROLOGICAL SYMPTOM DISORDER (CONVERSION DISORDER), WITH ABNORMAL MOVEMENT: ICD-10-CM

## 2024-03-22 PROCEDURE — 1036F TOBACCO NON-USER: CPT | Performed by: PSYCHIATRY & NEUROLOGY

## 2024-03-22 PROCEDURE — 3044F HG A1C LEVEL LT 7.0%: CPT | Performed by: PSYCHIATRY & NEUROLOGY

## 2024-03-22 PROCEDURE — 3049F LDL-C 100-129 MG/DL: CPT | Performed by: PSYCHIATRY & NEUROLOGY

## 2024-03-22 PROCEDURE — 99213 OFFICE O/P EST LOW 20 MIN: CPT | Performed by: PSYCHIATRY & NEUROLOGY

## 2024-03-22 PROCEDURE — 3061F NEG MICROALBUMINURIA REV: CPT | Performed by: PSYCHIATRY & NEUROLOGY

## 2024-03-22 PROCEDURE — 3008F BODY MASS INDEX DOCD: CPT | Performed by: PSYCHIATRY & NEUROLOGY

## 2024-03-22 NOTE — PATIENT INSTRUCTIONS
Plan:       - Continue cymbalta 60mg once daily.   - Continue counseling.   - Follow up in about 6 months.   - Contact via [employer] or call sooner ([phone]) in case of any questions or concerns.  - Call [phone] for mental health crisis or suicidal thoughts, or call 911 or go to the nearest emergency room for emergencies.
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
## 2024-03-29 ENCOUNTER — TELEPHONE (OUTPATIENT)
Dept: PRIMARY CARE | Facility: CLINIC | Age: 39
End: 2024-03-29
Payer: MEDICARE

## 2024-03-29 NOTE — TELEPHONE ENCOUNTER
Med question       ergocalciferol (Vitamin D-2) 1.25 MG (49394 UT) capsule [[id number]] not covered by patients insurance what's to know if another type could be prescribed so it insurance will cover it

## 2024-04-05 ENCOUNTER — TELEPHONE (OUTPATIENT)
Dept: PRIMARY CARE | Facility: CLINIC | Age: 39
End: 2024-04-05
Payer: MEDICARE

## 2024-04-25 ENCOUNTER — APPOINTMENT (OUTPATIENT)
Dept: PRIMARY CARE | Facility: CLINIC | Age: 39
End: 2024-04-25
Payer: MEDICARE

## 2024-05-23 ENCOUNTER — CLINICAL SUPPORT (OUTPATIENT)
Dept: PRIMARY CARE | Facility: CLINIC | Age: 39
End: 2024-05-23
Payer: MEDICARE

## 2024-05-23 DIAGNOSIS — E53.8 LOW SERUM VITAMIN B12: Primary | ICD-10-CM

## 2024-05-23 PROCEDURE — 96372 THER/PROPH/DIAG INJ SC/IM: CPT | Performed by: NURSE PRACTITIONER

## 2024-05-23 RX ORDER — CYANOCOBALAMIN 1000 UG/ML
1000 INJECTION, SOLUTION INTRAMUSCULAR; SUBCUTANEOUS ONCE
Status: COMPLETED | OUTPATIENT
Start: 2024-05-23 | End: 2024-05-23

## 2024-05-23 RX ADMIN — CYANOCOBALAMIN 1000 MCG: 1000 INJECTION, SOLUTION INTRAMUSCULAR; SUBCUTANEOUS at 14:46

## 2024-05-23 ASSESSMENT — ANXIETY QUESTIONNAIRES
2. NOT BEING ABLE TO STOP OR CONTROL WORRYING: NOT AT ALL
1. FEELING NERVOUS, ANXIOUS, OR ON EDGE: NOT AT ALL
6. BECOMING EASILY ANNOYED OR IRRITABLE: NOT AT ALL
GAD7 TOTAL SCORE: 0
5. BEING SO RESTLESS THAT IT IS HARD TO SIT STILL: NOT AT ALL
IF YOU CHECKED OFF ANY PROBLEMS ON THIS QUESTIONNAIRE, HOW DIFFICULT HAVE THESE PROBLEMS MADE IT FOR YOU TO DO YOUR WORK, TAKE CARE OF THINGS AT HOME, OR GET ALONG WITH OTHER PEOPLE: NOT DIFFICULT AT ALL
3. WORRYING TOO MUCH ABOUT DIFFERENT THINGS: NOT AT ALL
4. TROUBLE RELAXING: NOT AT ALL
7. FEELING AFRAID AS IF SOMETHING AWFUL MIGHT HAPPEN: NOT AT ALL

## 2024-05-23 ASSESSMENT — ENCOUNTER SYMPTOMS
OCCASIONAL FEELINGS OF UNSTEADINESS: 0
DEPRESSION: 0
LOSS OF SENSATION IN FEET: 0

## 2024-05-23 ASSESSMENT — PATIENT HEALTH QUESTIONNAIRE - PHQ9
1. LITTLE INTEREST OR PLEASURE IN DOING THINGS: NOT AT ALL
2. FEELING DOWN, DEPRESSED OR HOPELESS: NOT AT ALL
SUM OF ALL RESPONSES TO PHQ9 QUESTIONS 1 AND 2: 0

## 2024-05-23 ASSESSMENT — COLUMBIA-SUICIDE SEVERITY RATING SCALE - C-SSRS
1. IN THE PAST MONTH, HAVE YOU WISHED YOU WERE DEAD OR WISHED YOU COULD GO TO SLEEP AND NOT WAKE UP?: NO
6. HAVE YOU EVER DONE ANYTHING, STARTED TO DO ANYTHING, OR PREPARED TO DO ANYTHING TO END YOUR LIFE?: NO
2. HAVE YOU ACTUALLY HAD ANY THOUGHTS OF KILLING YOURSELF?: NO

## 2024-06-14 ENCOUNTER — OFFICE VISIT (OUTPATIENT)
Dept: PRIMARY CARE | Facility: CLINIC | Age: 39
End: 2024-06-14
Payer: MEDICARE

## 2024-06-14 ENCOUNTER — LAB (OUTPATIENT)
Dept: LAB | Facility: LAB | Age: 39
End: 2024-06-14
Payer: MEDICARE

## 2024-06-14 VITALS
HEART RATE: 106 BPM | OXYGEN SATURATION: 97 % | HEIGHT: 64 IN | DIASTOLIC BLOOD PRESSURE: 70 MMHG | WEIGHT: 169 LBS | BODY MASS INDEX: 28.85 KG/M2 | RESPIRATION RATE: 16 BRPM | SYSTOLIC BLOOD PRESSURE: 122 MMHG

## 2024-06-14 DIAGNOSIS — E11.65 TYPE 2 DIABETES MELLITUS WITH HYPERGLYCEMIA, WITHOUT LONG-TERM CURRENT USE OF INSULIN (MULTI): ICD-10-CM

## 2024-06-14 DIAGNOSIS — E78.5 DYSLIPIDEMIA: ICD-10-CM

## 2024-06-14 DIAGNOSIS — L91.8 ACQUIRED SKIN TAG: Primary | ICD-10-CM

## 2024-06-14 DIAGNOSIS — E55.9 VITAMIN D DEFICIENCY: ICD-10-CM

## 2024-06-14 LAB
25(OH)D3 SERPL-MCNC: 39 NG/ML (ref 30–100)
ALBUMIN SERPL BCP-MCNC: 4.1 G/DL (ref 3.4–5)
ALP SERPL-CCNC: 43 U/L (ref 33–110)
ALT SERPL W P-5'-P-CCNC: 9 U/L (ref 7–45)
ANION GAP SERPL CALC-SCNC: 11 MMOL/L (ref 10–20)
AST SERPL W P-5'-P-CCNC: 14 U/L (ref 9–39)
BILIRUB SERPL-MCNC: 0.6 MG/DL (ref 0–1.2)
BUN SERPL-MCNC: 9 MG/DL (ref 6–23)
CALCIUM SERPL-MCNC: 9.4 MG/DL (ref 8.6–10.3)
CHLORIDE SERPL-SCNC: 105 MMOL/L (ref 98–107)
CHOLEST SERPL-MCNC: 145 MG/DL (ref 0–199)
CHOLESTEROL/HDL RATIO: 3.1
CO2 SERPL-SCNC: 28 MMOL/L (ref 21–32)
CREAT SERPL-MCNC: 0.9 MG/DL (ref 0.5–1.05)
EGFRCR SERPLBLD CKD-EPI 2021: 85 ML/MIN/1.73M*2
EST. AVERAGE GLUCOSE BLD GHB EST-MCNC: 94 MG/DL
GLUCOSE SERPL-MCNC: 81 MG/DL (ref 74–99)
HBA1C MFR BLD: 4.9 %
HDLC SERPL-MCNC: 46.9 MG/DL
LDLC SERPL CALC-MCNC: 67 MG/DL
NON HDL CHOLESTEROL: 98 MG/DL (ref 0–149)
POTASSIUM SERPL-SCNC: 4.6 MMOL/L (ref 3.5–5.3)
PROT SERPL-MCNC: 7.3 G/DL (ref 6.4–8.2)
SODIUM SERPL-SCNC: 139 MMOL/L (ref 136–145)
TRIGL SERPL-MCNC: 155 MG/DL (ref 0–149)
VLDL: 31 MG/DL (ref 0–40)

## 2024-06-14 PROCEDURE — 3074F SYST BP LT 130 MM HG: CPT | Performed by: NURSE PRACTITIONER

## 2024-06-14 PROCEDURE — 3061F NEG MICROALBUMINURIA REV: CPT | Performed by: NURSE PRACTITIONER

## 2024-06-14 PROCEDURE — 83036 HEMOGLOBIN GLYCOSYLATED A1C: CPT

## 2024-06-14 PROCEDURE — 36415 COLL VENOUS BLD VENIPUNCTURE: CPT

## 2024-06-14 PROCEDURE — 1036F TOBACCO NON-USER: CPT | Performed by: NURSE PRACTITIONER

## 2024-06-14 PROCEDURE — 3078F DIAST BP <80 MM HG: CPT | Performed by: NURSE PRACTITIONER

## 2024-06-14 PROCEDURE — 80053 COMPREHEN METABOLIC PANEL: CPT

## 2024-06-14 PROCEDURE — 82306 VITAMIN D 25 HYDROXY: CPT

## 2024-06-14 PROCEDURE — 99212 OFFICE O/P EST SF 10 MIN: CPT | Performed by: NURSE PRACTITIONER

## 2024-06-14 PROCEDURE — 3044F HG A1C LEVEL LT 7.0%: CPT | Performed by: NURSE PRACTITIONER

## 2024-06-14 PROCEDURE — 80061 LIPID PANEL: CPT

## 2024-06-14 PROCEDURE — 3049F LDL-C 100-129 MG/DL: CPT | Performed by: NURSE PRACTITIONER

## 2024-06-14 ASSESSMENT — ANXIETY QUESTIONNAIRES
6. BECOMING EASILY ANNOYED OR IRRITABLE: NOT AT ALL
7. FEELING AFRAID AS IF SOMETHING AWFUL MIGHT HAPPEN: NOT AT ALL
GAD7 TOTAL SCORE: 0
2. NOT BEING ABLE TO STOP OR CONTROL WORRYING: NOT AT ALL
IF YOU CHECKED OFF ANY PROBLEMS ON THIS QUESTIONNAIRE, HOW DIFFICULT HAVE THESE PROBLEMS MADE IT FOR YOU TO DO YOUR WORK, TAKE CARE OF THINGS AT HOME, OR GET ALONG WITH OTHER PEOPLE: NOT DIFFICULT AT ALL
5. BEING SO RESTLESS THAT IT IS HARD TO SIT STILL: NOT AT ALL
4. TROUBLE RELAXING: NOT AT ALL
1. FEELING NERVOUS, ANXIOUS, OR ON EDGE: NOT AT ALL
3. WORRYING TOO MUCH ABOUT DIFFERENT THINGS: NOT AT ALL

## 2024-06-14 ASSESSMENT — ENCOUNTER SYMPTOMS
OCCASIONAL FEELINGS OF UNSTEADINESS: 0
DEPRESSION: 0
LOSS OF SENSATION IN FEET: 0

## 2024-06-14 ASSESSMENT — PATIENT HEALTH QUESTIONNAIRE - PHQ9
2. FEELING DOWN, DEPRESSED OR HOPELESS: NOT AT ALL
SUM OF ALL RESPONSES TO PHQ9 QUESTIONS 1 AND 2: 0
1. LITTLE INTEREST OR PLEASURE IN DOING THINGS: NOT AT ALL

## 2024-06-14 NOTE — PROGRESS NOTES
"Subjective   Patient ID: Nilsa Post is a 38 y.o. female who presents for mole on right shoulder .    Patient is accompanied by her  coming in with complaint of skin tag on on the front of her right shoulder.  Advises that the skin tag bleeds intermittently when it gets caught by her bra or cloth.          Review of Systems   All other systems reviewed and are negative.      Objective   /70   Pulse 106   Resp 16   Ht 1.626 m (5' 4\")   Wt 76.7 kg (169 lb)   SpO2 97%   BMI 29.01 kg/m²     Physical Exam  Constitutional:       Appearance: Normal appearance.   HENT:      Head: Normocephalic and atraumatic.      Right Ear: External ear normal.      Left Ear: External ear normal.      Nose: Nose normal.      Mouth/Throat:      Mouth: Mucous membranes are moist.   Cardiovascular:      Rate and Rhythm: Normal rate.   Pulmonary:      Effort: Pulmonary effort is normal.   Musculoskeletal:      Cervical back: Neck supple.   Skin:     General: Skin is warm and dry.      Findings: Lesion present.      Comments: Single skin tag of anterior right shoulder area.   Neurological:      Mental Status: She is alert and oriented to person, place, and time.   Psychiatric:         Mood and Affect: Mood normal.         Behavior: Behavior normal.         Thought Content: Thought content normal.         Judgment: Judgment normal.         Assessment/Plan   Problem List Items Addressed This Visit    None         "

## 2024-06-14 NOTE — PATIENT INSTRUCTIONS
I have referred to dermatology for the skin tag on your right shoulder.  Keep your predetermined follow-up visit with me on 9/17/2024.

## 2024-06-18 ENCOUNTER — TELEPHONE (OUTPATIENT)
Dept: PRIMARY CARE | Facility: CLINIC | Age: 39
End: 2024-06-18
Payer: MEDICARE

## 2024-06-18 NOTE — TELEPHONE ENCOUNTER
Marco Hawk, APRN-CNP  [id number] Clinical Support Staff  Please tell patient her lab results are normal.

## 2024-06-27 ENCOUNTER — APPOINTMENT (OUTPATIENT)
Dept: PRIMARY CARE | Facility: CLINIC | Age: 39
End: 2024-06-27
Payer: MEDICARE

## 2024-06-27 ENCOUNTER — TELEPHONE (OUTPATIENT)
Dept: PRIMARY CARE | Facility: CLINIC | Age: 39
End: 2024-06-27

## 2024-07-08 DIAGNOSIS — F41.9 ANXIETY: ICD-10-CM

## 2024-07-08 RX ORDER — DULOXETIN HYDROCHLORIDE 60 MG/1
60 CAPSULE, DELAYED RELEASE ORAL DAILY
Qty: 30 CAPSULE | Refills: 5 | Status: SHIPPED | OUTPATIENT
Start: 2024-07-08

## 2024-07-10 ENCOUNTER — TELEPHONE (OUTPATIENT)
Dept: PRIMARY CARE | Facility: CLINIC | Age: 39
End: 2024-07-10
Payer: MEDICARE

## 2024-07-10 DIAGNOSIS — E53.8 LOW SERUM VITAMIN B12: ICD-10-CM

## 2024-07-10 RX ORDER — NEEDLES, FILTER 19GX1 1/2"
2 NEEDLE, DISPOSABLE MISCELLANEOUS
Qty: 100 EACH | Refills: 11 | Status: SHIPPED | OUTPATIENT
Start: 2024-07-10

## 2024-07-10 NOTE — TELEPHONE ENCOUNTER
Rx Refill Request Telephone Encounter    Name:  Nilsa Post  :  [id number]  Medication Name:  BD Integra Syringe 3 ml          2 each every 30 days  Specific Pharm[facility]  [facility]

## 2024-07-19 ENCOUNTER — TELEPHONE (OUTPATIENT)
Dept: PRIMARY CARE | Facility: CLINIC | Age: 39
End: 2024-07-19
Payer: MEDICARE

## 2024-07-19 DIAGNOSIS — E53.8 LOW SERUM VITAMIN B12: ICD-10-CM

## 2024-07-19 NOTE — TELEPHONE ENCOUNTER
Med Refill   cyanocobalamin (Vitamin B-12) 1,000 mcg/mL injection [[id number]]      [id number] - [place], [place] - [address] AT [address]  [address] [place] [address]  Phone: [phone]  Fax: [phone]  CHASTITY #: [id number]

## 2024-07-19 NOTE — TELEPHONE ENCOUNTER
Med Refill   cyanocobalamin (Vitamin B-12) 1,000 mcg/mL injection [[id number]]     [id number] - [place], [place] - [address]  [address] [place] [address]  Phone: [phone]  Fax: [phone]  CHASTITY #: [id number]

## 2024-07-20 RX ORDER — CYANOCOBALAMIN 1000 UG/ML
1000 INJECTION, SOLUTION INTRAMUSCULAR; SUBCUTANEOUS SEE ADMIN INSTRUCTIONS
Qty: 1 ML | Refills: 3 | Status: SHIPPED | OUTPATIENT
Start: 2024-07-20

## 2024-07-23 ENCOUNTER — APPOINTMENT (OUTPATIENT)
Dept: PRIMARY CARE | Facility: CLINIC | Age: 39
End: 2024-07-23
Payer: MEDICARE

## 2024-08-09 ENCOUNTER — APPOINTMENT (OUTPATIENT)
Dept: BEHAVIORAL HEALTH | Facility: CLINIC | Age: 39
End: 2024-08-09
Payer: MEDICARE

## 2024-08-13 ENCOUNTER — TELEPHONE VISIT (OUTPATIENT)
Dept: PRIMARY CARE | Facility: CLINIC | Age: 39
End: 2024-08-13
Payer: MEDICARE

## 2024-08-13 ENCOUNTER — TELEPHONE (OUTPATIENT)
Dept: PRIMARY CARE | Facility: CLINIC | Age: 39
End: 2024-08-13
Payer: MEDICARE

## 2024-08-13 DIAGNOSIS — K21.9 GASTROESOPHAGEAL REFLUX DISEASE WITHOUT ESOPHAGITIS: ICD-10-CM

## 2024-08-13 DIAGNOSIS — F41.9 ANXIETY: ICD-10-CM

## 2024-08-13 RX ORDER — PANTOPRAZOLE SODIUM 40 MG/1
40 TABLET, DELAYED RELEASE ORAL DAILY
Qty: 90 TABLET | Refills: 2 | Status: SHIPPED | OUTPATIENT
Start: 2024-08-13

## 2024-08-13 NOTE — TELEPHONE ENCOUNTER
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  She needs it documented for her work

## 2024-08-15 ENCOUNTER — TELEPHONE (OUTPATIENT)
Dept: PRIMARY CARE | Facility: CLINIC | Age: 39
End: 2024-08-15
Payer: MEDICARE

## 2024-08-23 ENCOUNTER — APPOINTMENT (OUTPATIENT)
Dept: PRIMARY CARE | Facility: CLINIC | Age: 39
End: 2024-08-23
Payer: MEDICARE

## 2024-09-16 ENCOUNTER — APPOINTMENT (OUTPATIENT)
Dept: BEHAVIORAL HEALTH | Facility: CLINIC | Age: 39
End: 2024-09-16
Payer: MEDICARE

## 2024-09-16 DIAGNOSIS — F41.9 ANXIETY: ICD-10-CM

## 2024-09-16 DIAGNOSIS — F44.4 FUNCTIONAL NEUROLOGICAL SYMPTOM DISORDER (CONVERSION DISORDER), WITH ABNORMAL MOVEMENT: ICD-10-CM

## 2024-09-16 PROCEDURE — 3048F LDL-C <100 MG/DL: CPT | Performed by: PSYCHIATRY & NEUROLOGY

## 2024-09-16 PROCEDURE — 3044F HG A1C LEVEL LT 7.0%: CPT | Performed by: PSYCHIATRY & NEUROLOGY

## 2024-09-16 PROCEDURE — 3061F NEG MICROALBUMINURIA REV: CPT | Performed by: PSYCHIATRY & NEUROLOGY

## 2024-09-16 PROCEDURE — 99213 OFFICE O/P EST LOW 20 MIN: CPT | Performed by: PSYCHIATRY & NEUROLOGY

## 2024-09-16 RX ORDER — HYDROXYZINE HYDROCHLORIDE 10 MG/1
10 TABLET, FILM COATED ORAL EVERY 6 HOURS PRN
COMMUNITY

## 2024-09-16 NOTE — PATIENT INSTRUCTIONS
Plan:       - Continue cymbalta 60mg once daily.       - Use hydroxyzine 10mg only as needed up to 4 times daily.    - Continue counseling.   - Follow up in about 6 months.   - Contact via [employer] or call sooner ([phone]) in case of any questions or concerns.  - Call [phone] for mental health crisis or suicidal thoughts, or call 911 or go to the nearest emergency room for emergencies.

## 2024-09-16 NOTE — PROGRESS NOTES
"Outpatient Psychiatry      Reason for Visit:   Follow up for anxiety, functional neurological disorder    Subjective   Ms. Nilsa Post is a 38-year-old woman with a history of mixed functional neurological disorder, anxiety, fibromyalgia, essential tremors, diabetes, hyperlipidemia and asthma who is here for follow up. Follow up done virtually today.     Virtual or Telephone Consent:  An interactive audio and video telecommunication system which permits real time communications between the patient (at the originating site) and provider (at the distant site) was utilized to provide this telehealth service.   Verbal consent was requested and obtained from Nilsa Post on this date, 09/16/24 for a telehealth visit.     She says she is \"okay.\" She says she is \"tired.\"     Sleep - okay. She says she may stay up late sometimes.   Appetite is okay.   Energy - She says she gets tired easily; has fibromyalgia.   No death wishes or suicidal thoughts, intent or plans.     She says if she feels \"hot,\" her episodes will act up.  She says she was prescribed hydroxyzine 10mg q6hrs as needed by her neurologist - she says it helps with her \"episodes;\" has not had episodes. She says it makes her sleepy and she has to take naps during the day. She says she usually takes about two a day.     No AVH, paranoia or delusions.    She is still seeing her counselor (Tonya) at her seizure doctor's office at [facility].     She says they are planning to move to a house but it will be after the winter. She is living in a trailer with her  and son. She says she is behind on paying the mortgage. She has received some help.     She is on SSD but SSI was taken away.   She has a son and a daughter - they are both teenagers. Her son lives with her but daughter lives with her father.     Does not drink alcohol or use illicit drugs. Does not smoke cigarettes.     Uses medical marijuana to help with fibromyalgia pain. She has a TENS unit. " "She says the pain is \"ok for now.\" She says when her leg acts up, she has \"sharp pain\" in her knee but it goes away.     Current medications:   Duloxetine 60mg once daily.       Current Medications:    Current Outpatient Medications:     acetaminophen (Tylenol) 500 mg tablet, Take 2 tablets (1,000 mg) by mouth every 6 hours if needed., Disp: , Rfl:     albuterol (Ventolin HFA) 90 mcg/actuation inhaler, Inhale 2 puffs every 4 hours if needed for wheezing or shortness of breath., Disp: 18 g, Rfl: 11    BD Integra Syringe 3 mL 25 gauge x 1\" syringe, 2 each every 30 (thirty) days., Disp: 100 each, Rfl: 11    biotin 1,000 mcg tablet,chewable, Chew 2 tablets once daily., Disp: , Rfl:     blood sugar diagnostic (Accu-Chek Guide test strips) strip, 1 strip once daily., Disp: , Rfl:     budesonide-formoteroL (Symbicort) 160-4.5 mcg/actuation inhaler, Inhale 2 puffs twice a day., Disp: , Rfl:     cyanocobalamin (Vitamin B-12) 1,000 mcg/mL injection, Inject 1 mL (1,000 mcg) into the muscle see administration instructions. Inject 1 mL every 3 weeks and then 1 mL every month, Disp: 1 mL, Rfl: 3    dextrose 5% solution, Infuse into a venous catheter., Disp: , Rfl:     diclofenac sodium 1 % kit, Apply topically 4 times a day., Disp: , Rfl:     diphenhydrAMINE (Wal-Dryl Allergy) 25 mg tablet, Take 1 tablet (25 mg) by mouth., Disp: , Rfl:     DULoxetine (Cymbalta) 60 mg DR capsule, Take 1 capsule (60 mg) by mouth once daily., Disp: 30 capsule, Rfl: 5    ergocalciferol (Vitamin D-2) 1.25 MG (26363 UT) capsule, Take 1 capsule (50,000 Units) by mouth 1 (one) time per week., Disp: 12 capsule, Rfl: 2    fluconazole (Diflucan) 150 mg tablet, TAKE 1 TABLET BY MOUTH NOW. REPEAT IN 3 DAYS. REPEAT MONTHLY, Disp: 2 tablet, Rfl: 6    fluticasone propion-salmeteroL (AirDuo RespiClick) 232-14 mcg/actuation inhaler, Inhale., Disp: , Rfl:     ketorolac (Toradol) 10 mg tablet, Take 1 tablet (10 mg) by mouth every 6 hours if needed., Disp: , Rfl:     " oxybutynin XL (Ditropan-XL) 10 mg 24 hr tablet, Take 1 tablet (10 mg) by mouth once daily., Disp: , Rfl:     pantoprazole (ProtoNix) 40 mg EC tablet, Take 1 tablet (40 mg) by mouth once daily., Disp: 90 tablet, Rfl: 2    polyethylene glycol (Glycolax, Miralax) 17 gram/dose powder, MIX 17 GRAMS IN 8 OUNCES OF WATER AND DRINK TWICE DAILY, Disp: , Rfl:     rosuvastatin (Crestor) 40 mg tablet, Take 1 tablet (40 mg) by mouth once daily., Disp: 100 tablet, Rfl: 3    semaglutide (Rybelsus) 7 mg tablet, Take 1 tablet (7 mg) by mouth once daily., Disp: 90 tablet, Rfl: 2    sennosides-docusate sodium (Vivien-Colace) 8.6-50 mg tablet, Take 2 tablets by mouth once daily at bedtime., Disp: , Rfl:     SUMAtriptan (Imitrex) 100 mg tablet, Take 1 tablet (100 mg) by mouth if needed., Disp: , Rfl:     tiotropium (Spiriva Respimat) 1.25 mcg/actuation inhaler, Inhale 2 puffs once daily., Disp: , Rfl:     topiramate (Topamax) 25 mg tablet, Take 1 tablet (25 mg) by mouth once daily., Disp: , Rfl:     traMADol (Ultram) 50 mg tablet, Take 1 tablet (50 mg) by mouth every 6 hours if needed., Disp: , Rfl:   Medical History:  Past Medical History:   Diagnosis Date    Acute left ankle pain 05/19/2023    Back pain, thoracic 05/19/2023    Calcium kidney stone 05/19/2023    Calculus of kidney 01/09/2019    Calcium kidney stone    Conversion disorder 05/19/2023    Degeneration of intervertebral disc of lumbar region 05/19/2023    Depression 05/19/2023    Diffuse pain in left lower extremity 05/19/2023    Dysuria 05/19/2023    Elevated liver transaminase level 05/19/2023    Fatigue 05/19/2023    Food insecurity 05/19/2023    Frequent headaches 05/19/2023    Frequent urinary incontinence 05/19/2023    Functional neurological symptom disorder with mixed symptoms 05/19/2023    Left flank pain 05/19/2023    Left foot pain 05/19/2023    Left hand pain 05/19/2023    Left sided abdominal pain 05/19/2023    Leg dysfunction 05/19/2023    Lower back pain  05/19/2023    Lumbar radiculopathy, chronic 05/19/2023    Lumbar spondylosis 05/19/2023    Median neuropathy of both upper extremities 05/19/2023    Nonepileptic episode (Multi) 05/19/2023    Osteochondral lesion 05/19/2023    Other chronic diseases of tonsils and adenoids 11/29/2018    Asymmetry of tonsils    Other chronic diseases of tonsils and adenoids 02/15/2019    Chronic cryptitis of tonsil    Pain in left knee 05/19/2023    Personal history of other diseases of the nervous system and sense organs     History of tremor    Personal history of other diseases of the respiratory system 03/17/2019    History of acute sinusitis    Personal history of other diseases of the respiratory system 12/06/2018    History of sore throat    Polyneuropathy, unspecified 05/19/2023    Rectal bleeding 05/19/2023    Stress reaction, chronic 05/19/2023    Tear of medial meniscus of left knee, current 05/19/2023    Tendonitis 05/19/2023    Tendonitis, adhesive 05/19/2023    UTI (urinary tract infection) 05/19/2023    Weakness of extremity 05/19/2023    Weight gain 05/19/2023    Wrist sprain, left, initial encounter 05/19/2023    Yeast infection 05/19/2023     Surgical History:  Past Surgical History:   Procedure Laterality Date    OTHER SURGICAL HISTORY  06/01/2021    Total hysterectomy laparoscopic    OTHER SURGICAL HISTORY  11/09/2020    Breast surgery    OTHER SURGICAL HISTORY  09/23/2019    Carpal tunnel surgery    TUBAL LIGATION  04/09/2018    Tubal Ligation     Family History:  Family History   Problem Relation Name Age of Onset    Other (brain lesion) Mother      Asthma Mother      Other (cerebrovascular accident) Mother      Multiple sclerosis Mother      Seizures Mother      Other (mental illness in member of household) Mother      Tremor Sister       Social History:  Social History     Socioeconomic History    Marital status:      Spouse name: Not on file    Number of children: Not on file    Years of education: Not  "on file    Highest education level: Not on file   Occupational History    Not on file   Tobacco Use    Smoking status: Never    Smokeless tobacco: Never   Substance and Sexual Activity    Alcohol use: Not on file    Drug use: Not on file    Sexual activity: Not on file   Other Topics Concern    Not on file   Social History Narrative    Patient has an emotional support animal the breed for the animal is a pit mix and husky mix     Social Determinants of Health     Financial Resource Strain: High Risk (8/23/2024)    Received from [employer]    Overall Financial Resource Strain (CARDIA)     Difficulty of Paying Living Expenses: Very hard   Food Insecurity: Not on file   Transportation Needs: Not on file   Physical Activity: Not on file   Stress: Not on file   Social Connections: Not on file   Intimate Partner Violence: Not on file   Housing Stability: Not on file       Record Review: brief    Psychiatric Review Of Systems:  As above.      Medical Review Of Systems:  Pertinent positives as above.       Objective   Mental Status Exam:   Appearance: Appears to be stated age. Well groomed. Good hygiene.   Behavior/Attitude: Cooperative. Pleasant.   Motor: Psychomotor activity in average range. No abnormal involuntary movements noticed on video.   Speech: Somewhat slow; regular tone and volume. No pressure.  Mood: \"okay\" \"tired.\"   Affect: Slightly constricted. Congruent to stated mood.   Thought process: Goal-directed. Linear. Organized.  Thought content: No paranoia, delusion or ideas of reference elicited. No hallucinations in auditory, visual or other sensory modalities.   Suicidal ideation: denied.  Homicidal ideation: denied.   Insight: Fair.  Judgment: Fair.  Recent and remote memory: fair recall of recent and remote autobiographical memories.  Attention/concentration: intact during visit  Language: No aphasia or paraphasic errors during conversation   Fund of knowledge: Average.    Vitals:  There were no vitals " filed for this visit.    Assessment/Plan   Diagnosis:   Other specified depression  Anxiety disorder (panic disorder and TANYA features)  Functional neurological disorder, mixed symptoms   Vitamin D deficiency    Assessment:   Ms. Nilsa Post is a 38-year-old woman with a history of mixed functional neurological disorder, anxiety, fibromyalgia, essential tremors, diabetes, hyperlipidemia and asthma who is here for follow up. Follow up done virtually today.     9/16/24: dealing with financial stressors. Reports improvement in her episodes with using hydroxyzine as needed, but makes her sleepy.      Treatment Plan/Recommendations:       - Continue cymbalta 60mg once daily.       - Use hydroxyzine 10mg only as needed up to 4 times daily.    - Continue counseling.   - Provided supportive therapy.   - Follow up in about 6 months.   - Contact sooner if needed.     Review with patient: Treatment plan reviewed with the patient.    Kassandra Moreno MD.

## 2024-09-17 ENCOUNTER — APPOINTMENT (OUTPATIENT)
Dept: PRIMARY CARE | Facility: CLINIC | Age: 39
End: 2024-09-17
Payer: MEDICARE

## 2024-09-17 DIAGNOSIS — E53.8 LOW SERUM VITAMIN B12: ICD-10-CM

## 2024-09-17 DIAGNOSIS — J45.909 MODERATE ASTHMA, UNSPECIFIED WHETHER COMPLICATED, UNSPECIFIED WHETHER PERSISTENT (HHS-HCC): ICD-10-CM

## 2024-09-17 DIAGNOSIS — E11.42 TYPE 2 DIABETES MELLITUS WITH DIABETIC POLYNEUROPATHY, WITHOUT LONG-TERM CURRENT USE OF INSULIN: ICD-10-CM

## 2024-09-17 DIAGNOSIS — J30.9 ALLERGIC RHINITIS, UNSPECIFIED SEASONALITY, UNSPECIFIED TRIGGER: ICD-10-CM

## 2024-09-17 DIAGNOSIS — E11.65 TYPE 2 DIABETES MELLITUS WITH HYPERGLYCEMIA, WITHOUT LONG-TERM CURRENT USE OF INSULIN: Primary | ICD-10-CM

## 2024-09-17 DIAGNOSIS — Z00.00 MEDICARE ANNUAL WELLNESS VISIT, SUBSEQUENT: ICD-10-CM

## 2024-09-17 DIAGNOSIS — E55.9 VITAMIN D DEFICIENCY: ICD-10-CM

## 2024-09-17 DIAGNOSIS — R56.9 SEIZURE-LIKE ACTIVITY (MULTI): ICD-10-CM

## 2024-09-17 PROCEDURE — 3044F HG A1C LEVEL LT 7.0%: CPT | Performed by: NURSE PRACTITIONER

## 2024-09-17 PROCEDURE — 3061F NEG MICROALBUMINURIA REV: CPT | Performed by: NURSE PRACTITIONER

## 2024-09-17 PROCEDURE — 1036F TOBACCO NON-USER: CPT | Performed by: NURSE PRACTITIONER

## 2024-09-17 PROCEDURE — 99214 OFFICE O/P EST MOD 30 MIN: CPT | Performed by: NURSE PRACTITIONER

## 2024-09-17 PROCEDURE — 3048F LDL-C <100 MG/DL: CPT | Performed by: NURSE PRACTITIONER

## 2024-09-17 RX ORDER — FLUTICASONE PROPIONATE 50 MCG
1 SPRAY, SUSPENSION (ML) NASAL NIGHTLY
Qty: 16 G | Refills: 5 | Status: SHIPPED | OUTPATIENT
Start: 2024-09-17 | End: 2025-03-16

## 2024-09-17 ASSESSMENT — ENCOUNTER SYMPTOMS
RHINORRHEA: 1
EYE ITCHING: 1

## 2024-09-17 NOTE — PATIENT INSTRUCTIONS
Your lab results are unremarkable with hemoglobin A1c down to 4.9%.  Your triglyceride is slightly elevated at 155.  I have prescribed Flonase at bedtime for allergies.  Continue taking all current medications as prescribed and follow-up in 6 months for annual Medicare wellness exam.

## 2024-09-17 NOTE — PROGRESS NOTES
Subjective   Patient ID: Nilsa Post is a 38 y.o. female who presents for No chief complaint on file..    Patient is following up for lab results review and management of multiple chronic diseases.  Her lab results are unremarkable with hemoglobin A1c at 4.9%.  However, her triglycerides injury be elevated at 155. She requested this morning for her appointment to be switched to virtual because her allergies are acting up.  Advises she currently take Benadryl as needed for allergies with no side effect noted.  She denies any other acute medical complaint today.         Review of Systems   HENT:  Positive for congestion, postnasal drip, rhinorrhea and sneezing.    Eyes:  Positive for itching.   All other systems reviewed and are negative.      Objective   There were no vitals taken for this visit.    Physical Exam  Constitutional:       Appearance: Normal appearance.   Neurological:      Mental Status: She is alert and oriented to person, place, and time.   Psychiatric:         Mood and Affect: Mood normal.         Behavior: Behavior normal.         Thought Content: Thought content normal.         Judgment: Judgment normal.         Assessment/Plan   Problem List Items Addressed This Visit       Dyslipidemia    Type 2 diabetes mellitus with hyperglycemia, without long-term current use of insulin (Multi)    Low serum vitamin B12    Vitamin D deficiency     No physical exam was completed due to virtual visit.

## 2024-10-15 ENCOUNTER — TELEPHONE (OUTPATIENT)
Dept: PRIMARY CARE | Facility: CLINIC | Age: 39
End: 2024-10-15
Payer: MEDICARE

## 2024-10-15 DIAGNOSIS — R26.9 GAIT DISORDER: Primary | ICD-10-CM

## 2024-10-15 NOTE — LETTER
October 24, 2024     No Recipients    Patient: Nilsa Post   YOB: 1985   Date of Visit: 10/15/2024     To whom it may concern:    My patient Bala Post would benefit from a rollator walker which is a wheeled walker with a seat.  Justyna suffers from chronic pain and has weakness and debility from her chronic DM Fibromyalgia , asthma,  cervical dystonia , and seizure like activity. With the use of the rollator patient will be able to walk safer and during periods of fatigue she can lock the walker and sit down.  I think having this DME would improve her well being Frontal scalp thinning with intact frontal hairline and miniaturization quality of life.    Sincerely,         Marco Hawk, RUIZ-CNP

## 2024-10-16 NOTE — TELEPHONE ENCOUNTER
Patient called and asked to calling surance company to find DME company covered under insurance  Patient to call back PW on this LPN's desk

## 2024-10-29 ENCOUNTER — APPOINTMENT (OUTPATIENT)
Dept: UROLOGY | Facility: CLINIC | Age: 39
End: 2024-10-29
Payer: MEDICARE

## 2024-10-29 DIAGNOSIS — N39.0 RECURRENT UTI: Primary | ICD-10-CM

## 2024-10-29 PROCEDURE — 3061F NEG MICROALBUMINURIA REV: CPT | Performed by: STUDENT IN AN ORGANIZED HEALTH CARE EDUCATION/TRAINING PROGRAM

## 2024-10-29 PROCEDURE — 3048F LDL-C <100 MG/DL: CPT | Performed by: STUDENT IN AN ORGANIZED HEALTH CARE EDUCATION/TRAINING PROGRAM

## 2024-10-29 PROCEDURE — 3044F HG A1C LEVEL LT 7.0%: CPT | Performed by: STUDENT IN AN ORGANIZED HEALTH CARE EDUCATION/TRAINING PROGRAM

## 2024-10-29 PROCEDURE — 99442 PR PHYS/QHP TELEPHONE EVALUATION 11-20 MIN: CPT | Performed by: STUDENT IN AN ORGANIZED HEALTH CARE EDUCATION/TRAINING PROGRAM

## 2024-10-30 DIAGNOSIS — E11.65 TYPE 2 DIABETES MELLITUS WITH HYPERGLYCEMIA, WITHOUT LONG-TERM CURRENT USE OF INSULIN: ICD-10-CM

## 2024-10-30 RX ORDER — ORAL SEMAGLUTIDE 7 MG/1
TABLET ORAL
Qty: 90 TABLET | Refills: 2 | Status: SHIPPED | OUTPATIENT
Start: 2024-10-30

## 2024-11-11 DIAGNOSIS — E53.8 LOW SERUM VITAMIN B12: ICD-10-CM

## 2024-11-11 RX ORDER — CYANOCOBALAMIN 1000 UG/ML
INJECTION, SOLUTION INTRAMUSCULAR; SUBCUTANEOUS
Qty: 1 ML | Refills: 3 | Status: SHIPPED | OUTPATIENT
Start: 2024-11-11

## 2024-11-18 ENCOUNTER — TELEPHONE (OUTPATIENT)
Dept: OBSTETRICS AND GYNECOLOGY | Facility: CLINIC | Age: 39
End: 2024-11-18
Payer: MEDICARE

## 2024-11-19 NOTE — TELEPHONE ENCOUNTER
Attempted to call patient back, cell phone not accepting calls and unable to leave a message.  Karon Palmer 11/19/24 9:03 AM

## 2024-11-20 ENCOUNTER — APPOINTMENT (OUTPATIENT)
Dept: DERMATOLOGY | Facility: CLINIC | Age: 39
End: 2024-11-20
Payer: MEDICARE

## 2025-01-20 ENCOUNTER — APPOINTMENT (OUTPATIENT)
Dept: BEHAVIORAL HEALTH | Facility: CLINIC | Age: 40
End: 2025-01-20
Payer: MEDICARE

## 2025-01-20 ENCOUNTER — DOCUMENTATION (OUTPATIENT)
Dept: BEHAVIORAL HEALTH | Facility: CLINIC | Age: 40
End: 2025-01-20

## 2025-01-20 NOTE — PROGRESS NOTES
Patient was scheduled for follow up visit virtually today at 9:30AM. Patient did not join meeting virtually. Called phone - patient said she was sick and would not be able to do the visit today, and would like to cancel; she said she would call later to reschedule.

## 2025-02-10 ENCOUNTER — APPOINTMENT (OUTPATIENT)
Dept: BEHAVIORAL HEALTH | Facility: CLINIC | Age: 40
End: 2025-02-10
Payer: MEDICARE

## 2025-02-20 ENCOUNTER — TELEPHONE (OUTPATIENT)
Dept: UROLOGY | Facility: CLINIC | Age: 40
End: 2025-02-20
Payer: MEDICARE

## 2025-02-20 DIAGNOSIS — F41.9 ANXIETY: ICD-10-CM

## 2025-02-20 RX ORDER — DULOXETIN HYDROCHLORIDE 60 MG/1
60 CAPSULE, DELAYED RELEASE ORAL DAILY
Qty: 30 CAPSULE | Refills: 5 | Status: SHIPPED | OUTPATIENT
Start: 2025-02-20

## 2025-02-20 NOTE — TELEPHONE ENCOUNTER
Pt states she needs an ov with abdirizak  she says she has a device and needs to make appt     Call her at [phone]

## 2025-02-28 ENCOUNTER — APPOINTMENT (OUTPATIENT)
Dept: BEHAVIORAL HEALTH | Facility: CLINIC | Age: 40
End: 2025-02-28
Payer: MEDICARE

## 2025-03-04 ENCOUNTER — TELEMEDICINE (OUTPATIENT)
Facility: HOSPITAL | Age: 40
End: 2025-03-04
Payer: COMMERCIAL

## 2025-03-04 DIAGNOSIS — N32.81 OAB (OVERACTIVE BLADDER): Primary | ICD-10-CM

## 2025-03-04 NOTE — PROGRESS NOTES
HISTORY OF PRESENT ILLNESS:  Nilsa Post is a 39 y.o. female who presents today for a follow up visit. She is doing okay. She has no complaints or concerns at this time.           Past Medical History  She has a past medical history of Acute left ankle pain (05/19/2023), Back pain, thoracic (05/19/2023), Calcium kidney stone (05/19/2023), Calculus of kidney (01/09/2019), Conversion disorder (05/19/2023), Degeneration of intervertebral disc of lumbar region (05/19/2023), Depression (05/19/2023), Diffuse pain in left lower extremity (05/19/2023), Dysuria (05/19/2023), Elevated liver transaminase level (05/19/2023), Fatigue (05/19/2023), Food insecurity (05/19/2023), Frequent headaches (05/19/2023), Frequent urinary incontinence (05/19/2023), Functional neurological symptom disorder with mixed symptoms (05/19/2023), Left flank pain (05/19/2023), Left foot pain (05/19/2023), Left hand pain (05/19/2023), Left sided abdominal pain (05/19/2023), Leg dysfunction (05/19/2023), Lower back pain (05/19/2023), Lumbar radiculopathy, chronic (05/19/2023), Lumbar spondylosis (05/19/2023), Median neuropathy of both upper extremities (05/19/2023), Nonepileptic episode (Multi) (05/19/2023), Osteochondral lesion (05/19/2023), Other chronic diseases of tonsils and adenoids (11/29/2018), Other chronic diseases of tonsils and adenoids (02/15/2019), Pain in left knee (05/19/2023), Personal history of other diseases of the nervous system and sense organs, Personal history of other diseases of the respiratory system (03/17/2019), Personal history of other diseases of the respiratory system (12/06/2018), Polyneuropathy, unspecified (05/19/2023), Rectal bleeding (05/19/2023), Stress reaction, chronic (05/19/2023), Tear of medial meniscus of left knee, current (05/19/2023), Tendonitis (05/19/2023), Tendonitis, adhesive (05/19/2023), UTI (urinary tract infection) (05/19/2023), Weakness of extremity (05/19/2023), Weight gain (05/19/2023),  "Wrist sprain, left, initial encounter (05/19/2023), and Yeast infection (05/19/2023).    Surgical History  She has a past surgical history that includes Tubal ligation (04/09/2018); Other surgical history (06/01/2021); Other surgical history (11/09/2020); and Other surgical history (09/23/2019).     Social History  She reports that she has never smoked. She has never used smokeless tobacco. No history on file for alcohol use and drug use.    Family History  Family History   Problem Relation Name Age of Onset    Other (brain lesion) Mother      Asthma Mother      Other (cerebrovascular accident) Mother      Multiple sclerosis Mother      Seizures Mother      Other (mental illness in member of household) Mother      Tremor Sister          Allergies  Bee pollen, Bee venom protein (honey bee), and Beeswax      A comprehensive 10+ review of systems was negative except for: see hpi                          PHYSICAL EXAMINATION:  BP Readings from Last 3 Encounters:   06/14/24 122/70   03/14/24 118/78   11/22/23 118/68      Wt Readings from Last 3 Encounters:   06/14/24 76.7 kg (169 lb)   03/14/24 70.8 kg (156 lb)   11/22/23 68.9 kg (152 lb)      BMI: Estimated body mass index is 29.01 kg/m² as calculated from the following:    Height as of 6/14/24: 1.626 m (5' 4\").    Weight as of 6/14/24: 76.7 kg (169 lb).  BSA: Estimated body surface area is 1.86 meters squared as calculated from the following:    Height as of 6/14/24: 1.626 m (5' 4\").    Weight as of 6/14/24: 76.7 kg (169 lb).  HEENT: Normocephalic, atraumatic, PER EOMI, nonicteric, trachea normal, thyroid normal, oropharynx normal.  CARDIAC: regular rate & rhythm, S1 & S2 normal.  No heaves, thrills, gallops or murmurs.  LUNGS: Clear to auscultation, no spinal or CV tenderness.  EXTREMITIES: No evidence of cyanosis, clubbing or edema.               Assessment:  39-year-old status post sling for stress incontinence doing well from that standpoint but with OAB sx   "   OAB:  Failed Myrbetriq, oxybutynin  UDS demonstrates stress incontinence with high pressure but no obstruction   S/P Botox, 12/20/2022, 100 units no improvement   s/p stage 1 and 2 axonics 4/28/23 with >80% improvement, satisfied with her treatment, currently on cycling       Follow up in 1 year       All questions and concerns were answered and addressed.  The patient expressed understanding and agrees with the plan.     Aris Macias MD    Scribe Attestation  By signing my name below, I, Kandice Diaz, Scribliya   attest that this documentation has been prepared under the direction and in the presence of Aris Macias MD.

## 2025-03-18 PROBLEM — J06.9 UPPER RESPIRATORY TRACT INFECTION: Status: ACTIVE | Noted: 2025-03-18

## 2025-03-18 PROBLEM — E66.01 MORBID OBESITY (MULTI): Status: ACTIVE | Noted: 2023-04-28

## 2025-03-18 PROBLEM — G56.10 MEDIAN NERVE NEUROPATHY: Status: ACTIVE | Noted: 2025-03-18

## 2025-03-18 PROBLEM — G56.00 CARPAL TUNNEL SYNDROME: Status: ACTIVE | Noted: 2025-03-18

## 2025-03-18 PROBLEM — R35.0 INCREASED FREQUENCY OF URINATION: Status: RESOLVED | Noted: 2025-03-18 | Resolved: 2025-03-18

## 2025-03-18 PROBLEM — K29.70 GASTRITIS: Status: RESOLVED | Noted: 2023-05-19 | Resolved: 2025-03-18

## 2025-03-18 PROBLEM — E66.01 SEVERE OBESITY (MULTI): Status: RESOLVED | Noted: 2025-03-18 | Resolved: 2025-03-18

## 2025-03-18 PROBLEM — M25.579 ACUTE ANKLE PAIN: Status: RESOLVED | Noted: 2023-04-28 | Resolved: 2025-03-18

## 2025-03-18 PROBLEM — J35.8 ASYMMETRY OF TONSILS: Status: ACTIVE | Noted: 2025-03-18

## 2025-03-18 PROBLEM — N39.0 URINARY TRACT INFECTION: Status: RESOLVED | Noted: 2022-12-29 | Resolved: 2025-03-18

## 2025-03-18 PROBLEM — B49 INFECTION DUE TO FUNGUS: Status: RESOLVED | Noted: 2025-03-18 | Resolved: 2025-03-18

## 2025-03-18 PROBLEM — J34.89 NASAL DISCHARGE: Status: ACTIVE | Noted: 2025-03-18

## 2025-03-18 RX ORDER — DICLOFENAC SODIUM 10 MG/G
GEL TOPICAL
COMMUNITY
Start: 2024-08-12

## 2025-03-18 RX ORDER — SYRINGE WITH NEEDLE, 1 ML 25GX5/8"
SYRINGE, EMPTY DISPOSABLE MISCELLANEOUS
COMMUNITY
Start: 2024-08-22

## 2025-03-18 RX ORDER — HYDROXYZINE HYDROCHLORIDE 10 MG/1
10 TABLET, FILM COATED ORAL EVERY 6 HOURS PRN
COMMUNITY

## 2025-03-19 ENCOUNTER — APPOINTMENT (OUTPATIENT)
Dept: PRIMARY CARE | Facility: CLINIC | Age: 40
End: 2025-03-19
Payer: MEDICARE

## 2025-03-19 VITALS
WEIGHT: 172 LBS | HEART RATE: 72 BPM | HEIGHT: 64 IN | SYSTOLIC BLOOD PRESSURE: 110 MMHG | DIASTOLIC BLOOD PRESSURE: 68 MMHG | BODY MASS INDEX: 29.37 KG/M2

## 2025-03-19 DIAGNOSIS — E11.42 TYPE 2 DIABETES MELLITUS WITH DIABETIC POLYNEUROPATHY, WITHOUT LONG-TERM CURRENT USE OF INSULIN: ICD-10-CM

## 2025-03-19 DIAGNOSIS — E11.65 TYPE 2 DIABETES MELLITUS WITH HYPERGLYCEMIA, WITHOUT LONG-TERM CURRENT USE OF INSULIN: ICD-10-CM

## 2025-03-19 DIAGNOSIS — Z00.00 MEDICARE ANNUAL WELLNESS VISIT, SUBSEQUENT: Primary | ICD-10-CM

## 2025-03-19 DIAGNOSIS — Z71.89 ADVANCE DIRECTIVE DISCUSSED WITH PATIENT: ICD-10-CM

## 2025-03-19 DIAGNOSIS — K21.9 GASTRO-ESOPHAGEAL REFLUX DISEASE WITHOUT ESOPHAGITIS: ICD-10-CM

## 2025-03-19 DIAGNOSIS — J30.9 ALLERGIC RHINITIS, UNSPECIFIED SEASONALITY, UNSPECIFIED TRIGGER: ICD-10-CM

## 2025-03-19 DIAGNOSIS — E78.5 DYSLIPIDEMIA: ICD-10-CM

## 2025-03-19 DIAGNOSIS — Z11.4 ENCOUNTER FOR SCREENING FOR HIV: ICD-10-CM

## 2025-03-19 DIAGNOSIS — Z11.59 NEED FOR HEPATITIS C SCREENING TEST: ICD-10-CM

## 2025-03-19 DIAGNOSIS — R56.9 SEIZURE-LIKE ACTIVITY (MULTI): ICD-10-CM

## 2025-03-19 DIAGNOSIS — E55.9 VITAMIN D DEFICIENCY: ICD-10-CM

## 2025-03-19 DIAGNOSIS — E53.8 LOW SERUM VITAMIN B12: ICD-10-CM

## 2025-03-19 PROBLEM — E66.01 MORBID OBESITY (MULTI): Status: RESOLVED | Noted: 2023-04-28 | Resolved: 2025-03-19

## 2025-03-19 LAB — POC HEMOGLOBIN A1C: 5.8 % (ref 4.2–6.5)

## 2025-03-19 PROCEDURE — 3008F BODY MASS INDEX DOCD: CPT | Performed by: NURSE PRACTITIONER

## 2025-03-19 PROCEDURE — 3074F SYST BP LT 130 MM HG: CPT | Performed by: NURSE PRACTITIONER

## 2025-03-19 PROCEDURE — 1036F TOBACCO NON-USER: CPT | Performed by: NURSE PRACTITIONER

## 2025-03-19 PROCEDURE — 83036 HEMOGLOBIN GLYCOSYLATED A1C: CPT | Performed by: NURSE PRACTITIONER

## 2025-03-19 PROCEDURE — G0439 PPPS, SUBSEQ VISIT: HCPCS | Performed by: NURSE PRACTITIONER

## 2025-03-19 PROCEDURE — 99497 ADVNCD CARE PLAN 30 MIN: CPT | Performed by: NURSE PRACTITIONER

## 2025-03-19 PROCEDURE — 99214 OFFICE O/P EST MOD 30 MIN: CPT | Performed by: NURSE PRACTITIONER

## 2025-03-19 PROCEDURE — 3078F DIAST BP <80 MM HG: CPT | Performed by: NURSE PRACTITIONER

## 2025-03-19 RX ORDER — ERGOCALCIFEROL 1.25 MG/1
50000 CAPSULE ORAL WEEKLY
Qty: 12 CAPSULE | Refills: 2 | Status: SHIPPED | OUTPATIENT
Start: 2025-03-19 | End: 2025-12-14

## 2025-03-19 RX ORDER — HYDROXYZINE HYDROCHLORIDE 25 MG/1
TABLET, FILM COATED ORAL
COMMUNITY
Start: 2025-03-10

## 2025-03-19 RX ORDER — CYANOCOBALAMIN 1000 UG/ML
1000 INJECTION, SOLUTION INTRAMUSCULAR; SUBCUTANEOUS
Qty: 1 ML | Refills: 10 | Status: SHIPPED | OUTPATIENT
Start: 2025-03-19

## 2025-03-19 ASSESSMENT — ACTIVITIES OF DAILY LIVING (ADL)
MANAGING_FINANCES: INDEPENDENT
DOING_HOUSEWORK: INDEPENDENT
BATHING: INDEPENDENT
TAKING_MEDICATION: INDEPENDENT
DRESSING: INDEPENDENT
GROCERY_SHOPPING: INDEPENDENT

## 2025-03-19 ASSESSMENT — ENCOUNTER SYMPTOMS
DEPRESSION: 0
LOSS OF SENSATION IN FEET: 0
OCCASIONAL FEELINGS OF UNSTEADINESS: 0

## 2025-03-19 NOTE — PATIENT INSTRUCTIONS
Your in office hemoglobin A1c today is slightly elevated at 5.8%.  I have increased her Rybelsus to 14 mg daily.  You completed a screening colonoscopy on 1/15/2020 with recommendation to repeat for surveillance at the age of 47.  I have ordered labs today to complete a few days prior to 6-month follow-up.

## 2025-03-19 NOTE — ASSESSMENT & PLAN NOTE
Orders:    POCT glycosylated hemoglobin (Hb A1C) manually resulted    Albumin-Creatinine Ratio, Urine Random; Future    Comprehensive Metabolic Panel; Future    Hemoglobin A1C; Future    semaglutide (Rybelsus) 14 mg tablet tablet; Take 1 tablet (14 mg) by mouth once daily.

## 2025-03-19 NOTE — PROGRESS NOTES
"Subjective   Reason for Visit: Nilsa Post is an 39 y.o. female here for a Medicare Wellness visit.     Past Medical, Surgical, and Family History reviewed and updated in chart.    Reviewed all medications by prescribing practitioner or clinical pharmacist (such as prescriptions, OTCs, herbal therapies and supplements) and documented in the medical record.    Patient is ambulatory with a rollator for assistance following up for annual Medicare wellness exam and management of type 2 diabetes mellitus, dyslipidemia, GERD, allergic rhinitis, vitamin D deficiency, vitamin B12 deficiency, asthma and overweight.  She takes her medications as prescribed.  She completed a screening colonoscopy on 1/15/2020 with recommendation to repeat for surveillance at the age of 47.  Her in office hemoglobin A1c today is slightly elevated at 5.8%.  She denies acute medical complaint.        Patient Care Team:  CARLOS Peck as PCP - General (Family Medicine)  CARLOS Peck as PCP - [employer] PCP     Review of Systems   All other systems reviewed and are negative.      Objective   Vitals:  /68   Pulse 72   Ht 1.626 m (5' 4\")   Wt 78 kg (172 lb)   BMI 29.52 kg/m²       Physical Exam  Vitals reviewed.   Constitutional:       Appearance: Normal appearance.   HENT:      Head: Normocephalic and atraumatic.      Right Ear: Tympanic membrane, ear canal and external ear normal.      Left Ear: Tympanic membrane, ear canal and external ear normal.      Nose: Nose normal.      Mouth/Throat:      Mouth: Mucous membranes are moist.   Eyes:      Extraocular Movements: Extraocular movements intact.      Conjunctiva/sclera: Conjunctivae normal.      Pupils: Pupils are equal, round, and reactive to light.   Cardiovascular:      Rate and Rhythm: Normal rate and regular rhythm.      Pulses: Normal pulses.      Heart sounds: Normal heart sounds.   Pulmonary:      Effort: Pulmonary effort is normal.      " Breath sounds: Normal breath sounds.   Abdominal:      General: Bowel sounds are normal.      Palpations: Abdomen is soft.   Musculoskeletal:      Cervical back: Neck supple.   Skin:     General: Skin is warm and dry.   Neurological:      Mental Status: She is alert and oriented to person, place, and time. Mental status is at baseline.   Psychiatric:         Mood and Affect: Mood normal.         Behavior: Behavior normal.         Thought Content: Thought content normal.         Judgment: Judgment normal.         Assessment & Plan  Medicare annual wellness visit, subsequent    Orders:    Follow Up In [facility] - PCP - Medicare Annual    Type 2 diabetes mellitus with hyperglycemia, without long-term current use of insulin    Orders:    POCT glycosylated hemoglobin (Hb A1C) manually resulted    Albumin-Creatinine Ratio, Urine Random; Future    Comprehensive Metabolic Panel; Future    Hemoglobin A1C; Future    semaglutide (Rybelsus) 14 mg tablet tablet; Take 1 tablet (14 mg) by mouth once daily.    Advance directive discussed with patient    Orders:    1 Year Follow Up In [facility] - PCP - Wellness Exam; Future    Seizure-like activity (Multi)         Type 2 diabetes mellitus with diabetic polyneuropathy, without long-term current use of insulin         Need for hepatitis C screening test    Orders:    Hepatitis C antibody; Future    Encounter for screening for HIV    Orders:    HIV 1/2 Antigen/Antibody Screen with Reflex to Confirmation; Future    Vitamin D deficiency    Orders:    Vitamin D 25-Hydroxy,Total (for eval of Vitamin D levels); Future    ergocalciferol (Vitamin D-2) 1250 mcg (50,000 units) capsule; Take 1 capsule (50,000 Units) by mouth 1 (one) time per week.    Dyslipidemia    Orders:    Lipid panel; Future    TSH; Future    Low serum vitamin B12    Orders:    cyanocobalamin (Vitamin B-12) 1,000 mcg/mL injection; Inject 1 mL (1,000 mcg) into the muscle every 30 (thirty)  days.    Gastro-esophageal reflux disease without esophagitis         Allergic rhinitis, unspecified seasonality, unspecified trigger

## 2025-03-19 NOTE — ASSESSMENT & PLAN NOTE
Orders:    cyanocobalamin (Vitamin B-12) 1,000 mcg/mL injection; Inject 1 mL (1,000 mcg) into the muscle every 30 (thirty) days.

## 2025-03-19 NOTE — ASSESSMENT & PLAN NOTE
Orders:    Vitamin D 25-Hydroxy,Total (for eval of Vitamin D levels); Future    ergocalciferol (Vitamin D-2) 1250 mcg (50,000 units) capsule; Take 1 capsule (50,000 Units) by mouth 1 (one) time per week.

## 2025-06-25 DIAGNOSIS — E11.65 TYPE 2 DIABETES MELLITUS WITH HYPERGLYCEMIA, WITHOUT LONG-TERM CURRENT USE OF INSULIN: ICD-10-CM

## 2025-06-25 RX ORDER — ROSUVASTATIN CALCIUM 40 MG/1
40 TABLET, COATED ORAL DAILY
Qty: 100 TABLET | Refills: 3 | Status: SHIPPED | OUTPATIENT
Start: 2025-06-25 | End: 2026-07-30

## 2025-06-25 NOTE — TELEPHONE ENCOUNTER
Patient called to request medication refill.    Last appointment with our providers: 3/19/2025    Next appointment with our providers: 9/19/2025    Name of Medication:rosuvastatin (Crestor) 40 mg tablet     Pharmacy: [id number] - [address] AT [address] OF [address] [address]  Phone: [phone]  Fax: [phone]

## 2025-07-25 ENCOUNTER — TELEPHONE (OUTPATIENT)
Dept: PRIMARY CARE | Facility: CLINIC | Age: 40
End: 2025-07-25
Payer: MEDICARE

## 2025-07-25 DIAGNOSIS — R26.9 GAIT DISORDER: Primary | ICD-10-CM

## 2025-08-26 ENCOUNTER — APPOINTMENT (OUTPATIENT)
Dept: OBSTETRICS AND GYNECOLOGY | Facility: CLINIC | Age: 40
End: 2025-08-26
Payer: MEDICARE

## 2025-08-27 DIAGNOSIS — F41.9 ANXIETY: ICD-10-CM

## 2025-08-27 RX ORDER — DULOXETIN HYDROCHLORIDE 60 MG/1
60 CAPSULE, DELAYED RELEASE ORAL DAILY
Qty: 90 CAPSULE | Refills: 1 | Status: SHIPPED | OUTPATIENT
Start: 2025-08-27

## 2025-09-19 ENCOUNTER — APPOINTMENT (OUTPATIENT)
Dept: PRIMARY CARE | Facility: CLINIC | Age: 40
End: 2025-09-19
Payer: MEDICARE

## 2025-09-26 ENCOUNTER — APPOINTMENT (OUTPATIENT)
Dept: OBSTETRICS AND GYNECOLOGY | Facility: CLINIC | Age: 40
End: 2025-09-26
Payer: MEDICARE

## 2025-10-24 ENCOUNTER — APPOINTMENT (OUTPATIENT)
Dept: OBSTETRICS AND GYNECOLOGY | Facility: CLINIC | Age: 40
End: 2025-10-24
Payer: COMMERCIAL

## 2026-03-24 ENCOUNTER — APPOINTMENT (OUTPATIENT)
Dept: PRIMARY CARE | Facility: CLINIC | Age: 41
End: 2026-03-24
Payer: MEDICARE